# Patient Record
Sex: FEMALE | Race: BLACK OR AFRICAN AMERICAN | Employment: FULL TIME | URBAN - METROPOLITAN AREA
[De-identification: names, ages, dates, MRNs, and addresses within clinical notes are randomized per-mention and may not be internally consistent; named-entity substitution may affect disease eponyms.]

---

## 2021-07-28 PROBLEM — J02.0 STREP PHARYNGITIS: Status: ACTIVE | Noted: 2021-07-28

## 2021-07-29 ENCOUNTER — HOSPITAL ENCOUNTER (INPATIENT)
Age: 71
LOS: 1 days | Discharge: HOME | DRG: 153 | End: 2021-07-30
Attending: INTERNAL MEDICINE | Admitting: INTERNAL MEDICINE
Payer: COMMERCIAL

## 2021-07-29 PROBLEM — Z85.72 HISTORY OF B-CELL LYMPHOMA: Status: ACTIVE | Noted: 2021-03-24

## 2021-07-29 PROBLEM — N18.9 CHRONIC KIDNEY DISEASE: Status: ACTIVE | Noted: 2021-07-29

## 2021-07-29 LAB
ABSOLUTE EOS #: 0 K/UL (ref 0–0.4)
ABSOLUTE IMMATURE GRANULOCYTE: 0.1 K/UL (ref 0–0.3)
ABSOLUTE LYMPH #: 0.3 K/UL (ref 1–4.8)
ABSOLUTE MONO #: 0.4 K/UL (ref 0.2–0.8)
ANION GAP SERPL CALCULATED.3IONS-SCNC: 15 MMOL/L (ref 9–17)
BASOPHILS # BLD: 0 %
BASOPHILS ABSOLUTE: 0 K/UL (ref 0–0.2)
BUN BLDV-MCNC: 42 MG/DL (ref 8–23)
BUN/CREAT BLD: 49 (ref 9–20)
CALCIUM SERPL-MCNC: 8.8 MG/DL (ref 8.6–10.4)
CHLORIDE BLD-SCNC: 107 MMOL/L (ref 98–107)
CO2: 22 MMOL/L (ref 20–31)
CREAT SERPL-MCNC: 0.85 MG/DL (ref 0.5–0.9)
DIFFERENTIAL TYPE: ABNORMAL
EOSINOPHILS RELATIVE PERCENT: 0 % (ref 1–4)
GFR AFRICAN AMERICAN: >60 ML/MIN
GFR NON-AFRICAN AMERICAN: >60 ML/MIN
GFR SERPL CREATININE-BSD FRML MDRD: ABNORMAL ML/MIN/{1.73_M2}
GFR SERPL CREATININE-BSD FRML MDRD: ABNORMAL ML/MIN/{1.73_M2}
GLUCOSE BLD-MCNC: 115 MG/DL (ref 70–99)
HCT VFR BLD CALC: 30.8 % (ref 36.3–47.1)
HEMOGLOBIN: 9.8 G/DL (ref 11.9–15.1)
IMMATURE GRANULOCYTES: 1 %
LACTIC ACID: 1 MMOL/L (ref 0.5–2.2)
LACTIC ACID: 1.4 MMOL/L (ref 0.5–2.2)
LYMPHOCYTES # BLD: 3 % (ref 24–44)
MCH RBC QN AUTO: 29.3 PG (ref 25.2–33.5)
MCHC RBC AUTO-ENTMCNC: 31.8 G/DL (ref 28.4–34.8)
MCV RBC AUTO: 92.2 FL (ref 82.6–102.9)
MONOCYTES # BLD: 4 % (ref 1–7)
NRBC AUTOMATED: 0 PER 100 WBC
PDW BLD-RTO: 14.2 % (ref 11.8–14.4)
PLATELET # BLD: 232 K/UL (ref 138–453)
PLATELET ESTIMATE: ABNORMAL
PMV BLD AUTO: 9.8 FL (ref 8.1–13.5)
POTASSIUM SERPL-SCNC: 3.7 MMOL/L (ref 3.7–5.3)
PROCALCITONIN: 0.05 NG/ML
RBC # BLD: 3.34 M/UL (ref 3.95–5.11)
RBC # BLD: ABNORMAL 10*6/UL
SEG NEUTROPHILS: 92 % (ref 36–66)
SEGMENTED NEUTROPHILS ABSOLUTE COUNT: 9.2 K/UL (ref 1.8–7.7)
SODIUM BLD-SCNC: 144 MMOL/L (ref 135–144)
WBC # BLD: 10 K/UL (ref 3.5–11.3)
WBC # BLD: ABNORMAL 10*3/UL

## 2021-07-29 PROCEDURE — 80048 BASIC METABOLIC PNL TOTAL CA: CPT

## 2021-07-29 PROCEDURE — APPSS45 APP SPLIT SHARED TIME 31-45 MINUTES: Performed by: NURSE PRACTITIONER

## 2021-07-29 PROCEDURE — 97530 THERAPEUTIC ACTIVITIES: CPT

## 2021-07-29 PROCEDURE — 2580000003 HC RX 258: Performed by: NURSE PRACTITIONER

## 2021-07-29 PROCEDURE — 6360000002 HC RX W HCPCS: Performed by: NURSE PRACTITIONER

## 2021-07-29 PROCEDURE — 83605 ASSAY OF LACTIC ACID: CPT

## 2021-07-29 PROCEDURE — APPNB30 APP NON BILLABLE TIME 0-30 MINS: Performed by: NURSE PRACTITIONER

## 2021-07-29 PROCEDURE — 84145 PROCALCITONIN (PCT): CPT

## 2021-07-29 PROCEDURE — 87040 BLOOD CULTURE FOR BACTERIA: CPT

## 2021-07-29 PROCEDURE — 99222 1ST HOSP IP/OBS MODERATE 55: CPT | Performed by: NURSE PRACTITIONER

## 2021-07-29 PROCEDURE — 97116 GAIT TRAINING THERAPY: CPT

## 2021-07-29 PROCEDURE — 97162 PT EVAL MOD COMPLEX 30 MIN: CPT

## 2021-07-29 PROCEDURE — 36415 COLL VENOUS BLD VENIPUNCTURE: CPT

## 2021-07-29 PROCEDURE — 85025 COMPLETE CBC W/AUTO DIFF WBC: CPT

## 2021-07-29 PROCEDURE — 1200000000 HC SEMI PRIVATE

## 2021-07-29 RX ORDER — ACETAMINOPHEN 325 MG/1
650 TABLET ORAL EVERY 6 HOURS PRN
Status: DISCONTINUED | OUTPATIENT
Start: 2021-07-29 | End: 2021-07-30 | Stop reason: HOSPADM

## 2021-07-29 RX ORDER — POTASSIUM CHLORIDE 7.45 MG/ML
10 INJECTION INTRAVENOUS PRN
Status: DISCONTINUED | OUTPATIENT
Start: 2021-07-29 | End: 2021-07-30 | Stop reason: HOSPADM

## 2021-07-29 RX ORDER — RAMIPRIL 5 MG/1
5 CAPSULE ORAL DAILY
COMMUNITY

## 2021-07-29 RX ORDER — IBUPROFEN 600 MG/1
600 TABLET ORAL EVERY 8 HOURS PRN
Status: ON HOLD | COMMUNITY
End: 2021-07-29 | Stop reason: ALTCHOICE

## 2021-07-29 RX ORDER — SODIUM CHLORIDE 0.9 % (FLUSH) 0.9 %
10 SYRINGE (ML) INJECTION PRN
Status: DISCONTINUED | OUTPATIENT
Start: 2021-07-29 | End: 2021-07-30 | Stop reason: HOSPADM

## 2021-07-29 RX ORDER — ONDANSETRON 2 MG/ML
4 INJECTION INTRAMUSCULAR; INTRAVENOUS EVERY 6 HOURS PRN
Status: DISCONTINUED | OUTPATIENT
Start: 2021-07-29 | End: 2021-07-30 | Stop reason: HOSPADM

## 2021-07-29 RX ORDER — SODIUM CHLORIDE 9 MG/ML
INJECTION, SOLUTION INTRAVENOUS CONTINUOUS
Status: DISCONTINUED | OUTPATIENT
Start: 2021-07-29 | End: 2021-07-30 | Stop reason: HOSPADM

## 2021-07-29 RX ORDER — POLYETHYLENE GLYCOL 3350 17 G/17G
17 POWDER, FOR SOLUTION ORAL DAILY PRN
Status: DISCONTINUED | OUTPATIENT
Start: 2021-07-29 | End: 2021-07-30 | Stop reason: HOSPADM

## 2021-07-29 RX ORDER — IBRUTINIB 420 MG/1
420 TABLET, FILM COATED ORAL DAILY
COMMUNITY

## 2021-07-29 RX ORDER — GABAPENTIN 100 MG/1
100-200 CAPSULE ORAL 3 TIMES DAILY
COMMUNITY

## 2021-07-29 RX ORDER — SODIUM CHLORIDE 9 MG/ML
25 INJECTION, SOLUTION INTRAVENOUS PRN
Status: DISCONTINUED | OUTPATIENT
Start: 2021-07-29 | End: 2021-07-30 | Stop reason: HOSPADM

## 2021-07-29 RX ORDER — DEXAMETHASONE SODIUM PHOSPHATE 10 MG/ML
10 INJECTION, SOLUTION INTRAMUSCULAR; INTRAVENOUS EVERY 8 HOURS
Status: DISCONTINUED | OUTPATIENT
Start: 2021-07-29 | End: 2021-07-30 | Stop reason: HOSPADM

## 2021-07-29 RX ORDER — ACETAMINOPHEN 650 MG/1
650 SUPPOSITORY RECTAL EVERY 6 HOURS PRN
Status: DISCONTINUED | OUTPATIENT
Start: 2021-07-29 | End: 2021-07-30 | Stop reason: HOSPADM

## 2021-07-29 RX ORDER — ONDANSETRON 4 MG/1
4 TABLET, ORALLY DISINTEGRATING ORAL EVERY 8 HOURS PRN
Status: DISCONTINUED | OUTPATIENT
Start: 2021-07-29 | End: 2021-07-30 | Stop reason: HOSPADM

## 2021-07-29 RX ORDER — CARVEDILOL 25 MG/1
25 TABLET ORAL 2 TIMES DAILY WITH MEALS
COMMUNITY

## 2021-07-29 RX ORDER — MAGNESIUM SULFATE 1 G/100ML
1000 INJECTION INTRAVENOUS PRN
Status: DISCONTINUED | OUTPATIENT
Start: 2021-07-29 | End: 2021-07-30 | Stop reason: HOSPADM

## 2021-07-29 RX ORDER — POTASSIUM CHLORIDE 20 MEQ/1
40 TABLET, EXTENDED RELEASE ORAL PRN
Status: DISCONTINUED | OUTPATIENT
Start: 2021-07-29 | End: 2021-07-30 | Stop reason: HOSPADM

## 2021-07-29 RX ORDER — SODIUM CHLORIDE 0.9 % (FLUSH) 0.9 %
5-40 SYRINGE (ML) INJECTION EVERY 12 HOURS SCHEDULED
Status: DISCONTINUED | OUTPATIENT
Start: 2021-07-29 | End: 2021-07-30 | Stop reason: HOSPADM

## 2021-07-29 RX ADMIN — ENOXAPARIN SODIUM 40 MG: 40 INJECTION SUBCUTANEOUS at 11:38

## 2021-07-29 RX ADMIN — SODIUM CHLORIDE 1500 MG: 900 INJECTION INTRAVENOUS at 21:37

## 2021-07-29 RX ADMIN — SODIUM CHLORIDE, PRESERVATIVE FREE 10 ML: 5 INJECTION INTRAVENOUS at 18:30

## 2021-07-29 RX ADMIN — SODIUM CHLORIDE 1500 MG: 900 INJECTION INTRAVENOUS at 15:43

## 2021-07-29 RX ADMIN — SODIUM CHLORIDE 1500 MG: 900 INJECTION INTRAVENOUS at 11:38

## 2021-07-29 RX ADMIN — DEXAMETHASONE SODIUM PHOSPHATE 10 MG: 10 INJECTION, SOLUTION INTRAMUSCULAR; INTRAVENOUS at 11:38

## 2021-07-29 RX ADMIN — DEXAMETHASONE SODIUM PHOSPHATE 10 MG: 10 INJECTION, SOLUTION INTRAMUSCULAR; INTRAVENOUS at 18:29

## 2021-07-29 RX ADMIN — SODIUM CHLORIDE: 9 INJECTION, SOLUTION INTRAVENOUS at 11:37

## 2021-07-29 ASSESSMENT — ENCOUNTER SYMPTOMS
ABDOMINAL PAIN: 0
SHORTNESS OF BREATH: 0
APNEA: 0
SORE THROAT: 1
CHEST TIGHTNESS: 0
COLOR CHANGE: 0
SINUS PAIN: 0
PHOTOPHOBIA: 0
ABDOMINAL DISTENTION: 0
SINUS PRESSURE: 1

## 2021-07-29 ASSESSMENT — PAIN SCALES - GENERAL
PAINLEVEL_OUTOF10: 0
PAINLEVEL_OUTOF10: 0

## 2021-07-29 NOTE — H&P
[facility]  Office: [address], DO, Eric Thomson, DO, Brayan Doyle, DO, Rosa Rico, DO, Diya Meyer MD, Trent Nunez MD, Ata Maradiaga MD, Jori Tyler MD, Danielle Dodson MD, Eileen Benjamin MD, Jorge Bautista MD, Phoebe Rodriguez DO, Alicja Hernández MD, Norah Frank DO, Juan R Jiménez MD,  Nigel Ross DO, Jesse Rossi MD, Anam Jimenez MD, Sushila Palmer MD, Lyudmila Heredia MD, Harini Moreno MD, Mirta Waddell MD, Betty Genao, [facility], [place], CNP, Cynthia Madden, CNP, Charley Ann, [facility], Yoseph Ruby, CNP, Margo Jett, CNP, Kvng Castellon, CNP, Jarocho Villar, CNP, Johnny Jo, CNP, Marilia Vega PA-C, Belle Cardenas, [facility], Yumiko Muller, CNP, Valerie Calix, CNP, Renato Galindo, CNP, Joo Hull, CNP, Jhon Lawrence, CNP, Kathi Peña, CNP, Cosme Howard, CNP, Gena Looney, CNP         [facility]   [address]    HISTORY AND PHYSICAL EXAMINATION            Date:   7/29/2021  Patient name:  Parisa Lema  Date of admission:  7/29/2021  8:53 AM  MRN:   [id number]  Account:  [de-identified]  YOB: 1950  PCP:    Steve Alvarez MD  Room:   2004/2004-02  Code Status:    Full Code    Chief Complaint:     Sore throat    History Obtained From:     patient    History of Present Illness:     Parisa Lema is a 78 y.o. Non- / non  female who presents with No chief complaint on file. and is admitted to the hospital for the management of Strep pharyngitis. Patient reports to outlying facility with a chief complaint of sore throat. Patient underwent extensive evaluation and was found to have strep positive pharyngitis. Patient was reportedly unable to swallow secretions at that time. Treatment was initiated and included imaging studies. CT of the neck reveals submandibular duct sialolith with sialoadenitis with enhancement of the duct.   Multiple adjacent enlarged cervical lymph nodes and soft tissue swelling are also noted. Abscess within cannot be excluded. As ear nose and throat was not available at first facility she was transferred here. Patient had a prolonged wait time secondary to bed availability and has received multiple doses of Unasyn as well as Decadron. Patient has experienced significant reduction in her swelling and discomfort. Patient reports that she is now able to swallow her secretions without complication. Patient reports mild oral discomfort at this time. ENT consult has been requested and is pending. Of additional note patient also has a personal history of lymphoma and ports that she takes medicines at night however she does not know if she is actively being treated or not. Full dictation of soft tissue neck CT is available below as well as in the media tab, actual images are unavailable on this current system. Past Medical History:     Past Medical History:   Diagnosis Date    Hypertension     Small cell B-cell lymphoma ([facility] [address]         Past Surgical History:     Past Surgical History:   Procedure Laterality Date    BONE MARROW BIOPSY Right 10/12/2016    By Dr. Loan Robbins        Medications Prior to Admission:     Prior to Admission medications    Medication Sig Start Date End Date Taking? Authorizing Provider   carvedilol (COREG) 25 MG tablet Take 25 mg by mouth 2 times daily (with meals)    Historical Provider, MD   calcium citrate (CALCITRATE) 200 MG TABS tablet Take 2 tablets by mouth 3 times daily    Historical Provider, MD   vitamin D (CHOLECALCIFEROL) 25 MCG (1000 UT) TABS tablet Take 1,000 Units by mouth daily    Historical Provider, MD   gabapentin (NEURONTIN) 100 MG capsule Take 200 mg by mouth 3 times daily.     Historical Provider, MD   ibrutinib (IMBRUVICA) 420 MG tablet Take 420 mg by mouth daily    Historical Provider, MD   ibuprofen (ADVIL;MOTRIN) 600 MG tablet Take 600 mg by mouth every 8 hours as needed for Pain    Historical Provider, MD acetaminophen (TYLENOL) 325 MG tablet Take 2 tablets by mouth every 6 hours as needed for Pain 10/15/16   Ryan Larry DO   atorvastatin (LIPITOR) 40 MG tablet Take 40 mg by mouth daily    Historical Provider, MD   amLODIPine (NORVASC) 10 MG tablet Take 10 mg by mouth daily    Historical Provider, MD   nitroGLYCERIN (NITROSTAT) 0.4 MG SL tablet Place 0.4 mg under the tongue every 5 minutes as needed for Chest pain Dissolve 1 tab under tongue at first sign of chest pain. May repeat every 5 minutes until relief is obtained. If pain persists after taking 3 tabs in a 15-minute period, or the pain is different than is typically experienced, call 9-1-1 immediately. Historical Provider, MD        Allergies:     Patient has no known allergies. Social History:     Tobacco:    reports that she has never smoked. She does not have any smokeless tobacco history on file. Alcohol:      reports no history of alcohol use. Drug Use:  reports no history of drug use. Family History:     Family History   Problem Relation Age of Onset    Heart Failure Father     Diabetes Brother        Review of Systems:     Positive and Negative as described in HPI. Review of Systems   Constitutional: Positive for activity change. Negative for appetite change and fever. HENT: Positive for drooling, sinus pressure and sore throat. Negative for sinus pain. Eyes: Negative for photophobia and visual disturbance. Respiratory: Negative for apnea, chest tightness and shortness of breath. Cardiovascular: Negative. Negative for chest pain and palpitations. Gastrointestinal: Negative for abdominal distention and abdominal pain. Endocrine: Negative for cold intolerance and heat intolerance. Genitourinary: Negative for difficulty urinating, frequency and urgency. Musculoskeletal: Negative for arthralgias and myalgias. Skin: Negative for color change, pallor and rash.    Allergic/Immunologic: Negative for environmental allergies and immunocompromised state. Neurological: Negative for syncope, facial asymmetry and weakness. Hematological: Negative for adenopathy. Does not bruise/bleed easily. Psychiatric/Behavioral: Negative for agitation. Physical Exam:   BP (!) 141/52   Pulse 53   Temp 97.9 °F (36.6 °C) (Oral)   Resp 16   Ht 5' (1.524 m)   Wt 129 lb (58.5 kg)   SpO2 100%   BMI 25.19 kg/m²   Temp (24hrs), Av.9 °F (36.6 °C), Min:97.9 °F (36.6 °C), Max:97.9 °F (36.6 °C)    No results for input(s): POCGLU in the last 72 hours. No intake or output data in the 24 hours ending 21 1105    Physical Exam  Constitutional:       General: She is not in acute distress. Appearance: Normal appearance. She is normal weight. She is not toxic-appearing. HENT:      Head: Normocephalic and atraumatic. Right Ear: Tympanic membrane normal.      Left Ear: Tympanic membrane normal.      Nose: Nose normal.      Mouth/Throat:      Mouth: Mucous membranes are moist. Angioedema (mild) present. Dentition: No dental abscesses. Tongue: No lesions. Tongue does not deviate from midline. Pharynx: Uvula midline. Pharyngeal swelling and posterior oropharyngeal erythema present. No oropharyngeal exudate. Tonsils: No tonsillar exudate or tonsillar abscesses. Eyes:      Extraocular Movements: Extraocular movements intact. Conjunctiva/sclera: Conjunctivae normal.      Pupils: Pupils are equal, round, and reactive to light. Neck:      Trachea: Trachea normal. No tracheal deviation. Cardiovascular:      Rate and Rhythm: Normal rate and regular rhythm. Pulses: Normal pulses. Heart sounds: Normal heart sounds. No murmur heard. Pulmonary:      Effort: Pulmonary effort is normal. No respiratory distress. Breath sounds: Normal breath sounds. Abdominal:      General: Abdomen is flat. Bowel sounds are normal. There is no distension. Palpations: Abdomen is soft.       Tenderness: There is no abdominal tenderness. Genitourinary:     General: Normal vulva. Musculoskeletal:         General: No swelling or tenderness. Normal range of motion. Cervical back: Normal range of motion and neck supple. No rigidity. Lymphadenopathy:      Cervical: Cervical adenopathy present. Right cervical: Superficial cervical adenopathy present. No posterior cervical adenopathy. Left cervical: Superficial cervical adenopathy present. No posterior cervical adenopathy. Skin:     General: Skin is warm and dry. Capillary Refill: Capillary refill takes less than 2 seconds. Coloration: Skin is not pale. Neurological:      General: No focal deficit present. Mental Status: She is alert and oriented to person, place, and time. Mental status is at baseline.    Psychiatric:         Mood and Affect: Mood normal.         Behavior: Behavior normal.         Investigations:      Laboratory Testing:  Recent Results (from the past 24 hour(s))   CBC Auto Differential    Collection Time: 07/29/21 10:03 AM   Result Value Ref Range    WBC 10.0 3.5 - 11.3 k/uL    RBC 3.34 (L) 3.95 - 5.11 m/uL    Hemoglobin 9.8 (L) 11.9 - 15.1 g/dL    Hematocrit 30.8 (L) 36.3 - 47.1 %    MCV 92.2 82.6 - 102.9 fL    MCH 29.3 25.2 - 33.5 pg    MCHC 31.8 28.4 - 34.8 g/dL    RDW 14.2 11.8 - 14.4 %    Platelets 890 680 - 385 k/uL    MPV 9.8 8.1 - 13.5 fL    NRBC Automated 0.0 0.0 per 100 WBC    Differential Type NOT REPORTED     WBC Morphology NOT REPORTED     RBC Morphology NOT REPORTED     Platelet Estimate NOT REPORTED     Seg Neutrophils 92 (H) 36 - 66 %    Lymphocytes 3 (L) 24 - 44 %    Monocytes 4 1 - 7 %    Eosinophils % 0 (L) 1 - 4 %    Basophils 0 %    Immature Granulocytes 1 (H) 0 %    Segs Absolute 9.20 (H) 1.8 - 7.7 k/uL    Absolute Lymph # 0.30 (L) 1.0 - 4.8 k/uL    Absolute Mono # 0.40 0.2 - 0.8 k/uL    Absolute Eos # 0.00 0.0 - 0.4 k/uL    Basophils Absolute 0.00 0.0 - 0.2 k/uL    Absolute Immature Granulocyte 0. 10 0.00 - 0.30 k/uL   Basic Metabolic Panel w/ Reflex to MG    Collection Time: 07/29/21 10:03 AM   Result Value Ref Range    Glucose 115 (H) 70 - 99 mg/dL    BUN 42 (H) 8 - 23 mg/dL    CREATININE 0.85 0.50 - 0.90 mg/dL    Bun/Cre Ratio 49 (H) 9 - 20    Calcium 8.8 8.6 - 10.4 mg/dL    Sodium 144 135 - 144 mmol/L    Potassium 3.7 3.7 - 5.3 mmol/L    Chloride 107 98 - 107 mmol/L    CO2 22 20 - 31 mmol/L    Anion Gap 15 9 - 17 mmol/L    GFR Non-African American >60 >60 mL/min    GFR African American >60 >60 mL/min    GFR Comment          GFR Staging NOT REPORTED    Lactic Acid    Collection Time: 07/29/21 10:03 AM   Result Value Ref Range    Lactic Acid 1.0 0.5 - 2.2 mmol/L            Imaging/Diagnostics:  No results found. Assessment :      Hospital Problems         Last Modified POA    * (Principal) Strep pharyngitis 7/29/2021 Yes    Lymphoma ([facility] [address] 7/29/2021 Yes    Benign essential HTN 7/29/2021 Yes    Pure hypercholesterolemia 7/29/2021 Yes    Chronic kidney disease 7/29/2021 Yes    History of B-cell lymphoma 7/29/2021 Yes                Plan:     Patient status inpatient in the  Med/Surge    1. Strep pharyngitis with possible abscess  1. Continue Unasyn as ordered  1. Add lactic acid x2 and blood cultures x2  2. Continue steroids as ordered  3. Okay to advance diet to full liquid from internal medicine standpoint, defer to ENT on advancement timeline  4. ENT consultation pending  2. Essential hypertension with hypercholesterolemia  1. We will restart home medication regiment once pharmacy review is completed  3. Personal history of chronic kidney disease, renal function back to baseline, avoid nephrotoxic medications if able  4. Personal history of lymphoma  1.  We will restart home medications once pharmacy evaluation is completed      Consultations:   IP CONSULT TO OTOLARYNGOLOGY    Patient is admitted as inpatient status because of co-morbidities listed above, severity of signs and symptoms as outlined, requirement for current medical therapies and most importantly because of direct risk to patient if care not provided in a hospital setting. Expected length of stay > 48 hours.     COLBY Lei NP  7/29/2021  11:05 AM    Copy sent to Dr. Michelle Roberts MD

## 2021-07-29 NOTE — PLAN OF CARE
Problem: Falls - Risk of:  Goal: Will remain free from falls  Description: Will remain free from falls  Outcome: Ongoing  Goal: Absence of physical injury  Description: Absence of physical injury  Outcome: Ongoing     Problem: Pain:  Goal: Pain level will decrease  Description: Pain level will decrease  Outcome: Ongoing  Goal: Control of acute pain  Description: Control of acute pain  Outcome: Ongoing  Goal: Control of chronic pain  Description: Control of chronic pain  Outcome: Ongoing   Patient is a fall risk during this admission. Fall risk assessment was performed. Patient is absent of falls. Bed is in the lowest position. Wheels on the bed are locked. Call light and bed side table are within reach. Clutter is removed. Patient was educated to call out when needing assistance or wanting to get out of bed. Patient offered toileting assistance during rounding. Hourly rounds have been performed. Pt medicated with pain medication prn. Assessed all pain characteristics including level, type, location, frequency, and onset. Non-pharmacologic interventions offered to pt as well. Pt states pain is tolerable at this time.  Will continue to monitor

## 2021-07-29 NOTE — PROGRESS NOTES
Transitions of Care Pharmacy Service   Medication Review    The patient's list of current home medications was reviewed with her earlier today. Source(s) of information: patient, Care Everywhere, Surescripts refill report      Please feel free to call me with any questions about this encounter. Thank you. Hong Bah [address] [place] [address]   Transitions of Care Pharmacy Service  Phone:  [phone]  Fax: [phone]      Electronically signed by Hong Bah [address] [place] [address] on 7/29/2021 at 6:21 PM         Medications Prior to Admission:   carvedilol (COREG) 25 MG tablet, Take 25 mg by mouth 2 times daily (with meals)  calcium citrate (CALCITRATE) 200 MG TABS tablet, Take 2 tablets by mouth 3 times daily  vitamin D (CHOLECALCIFEROL) 25 MCG (1000 UT) TABS tablet, Take 1,000 Units by mouth 2 times daily (with meals)   gabapentin (NEURONTIN) 100 MG capsule, Take 100-200 mg by mouth 3 times daily.    ibrutinib (IMBRUVICA) 420 MG tablet, Take 420 mg by mouth daily  ramipril (ALTACE) 5 MG capsule, Take 5 mg by mouth daily  acetaminophen (TYLENOL) 325 MG tablet, Take 2 tablets by mouth every 6 hours as needed for Pain  atorvastatin (LIPITOR) 40 MG tablet, Take 40 mg by mouth daily  amLODIPine (NORVASC) 10 MG tablet, Take 10 mg by mouth daily  nitroGLYCERIN (NITROSTAT) 0.4 MG SL tablet, Place 0.4 mg under the tongue every 5 minutes as needed for Chest pain

## 2021-07-29 NOTE — PROGRESS NOTES
Physical Therapy    Facility/Department: [facility]  Initial Assessment    NAME: Zane Del Angel  : 1950  MRN: [id number]    Date of Service: 2021    PER HPI:    Zane Del Angel is a 78 y.o. Non- / non  female who presents with No chief complaint on file. and is admitted to the hospital for the management of Strep pharyngitis.     Patient reports to outlying facility with a chief complaint of sore throat. Patient underwent extensive evaluation and was found to have strep positive pharyngitis. Patient was reportedly unable to swallow secretions at that time. Treatment was initiated and included imaging studies. CT of the neck reveals submandibular duct sialolith with sialoadenitis with enhancement of the duct. Multiple adjacent enlarged cervical lymph nodes and soft tissue swelling are also noted. Abscess within cannot be excluded. As ear nose and throat was not available at first facility she was transferred here. Patient had a prolonged wait time secondary to bed availability and has received multiple doses of Unasyn as well as Decadron. Patient has experienced significant reduction in her swelling and discomfort. Patient reports that she is now able to swallow her secretions without complication. Patient reports mild oral discomfort at this time. ENT consult has been requested and is pending. Of additional note patient also has a personal history of lymphoma and ports that she takes medicines at night however she does not know if she is actively being treated or not. Discharge Recommendations:  Patient would benefit from continued therapy after discharge     Due to recent hospitalization and medical condition, pt would benefit from additional therapy at time of discharge to ensure safety. Please refer to the AM-PAC score for current functional status. Assessment   Body structures, Functions, Activity limitations: Decreased functional mobility ; Decreased balance;Decreased safe awareness;Decreased endurance;Decreased strength  Assessment: Skilled therapy needed to address deficits of strength, balance, endurance and mobility. Patient does get assist at home but still is not at baseline from fall in March. Recommend further PT at discharge. Prognosis: Good  Decision Making: Medium Complexity  Exam: AROM, strength, endurance, balance, mobility, AM-PAC  Clinical Presentation: evolving  PT Education: Goals;PT Role;Plan of Care;General Safety;Transfer Training;Gait Training  REQUIRES PT FOLLOW UP: Yes  Activity Tolerance  Activity Tolerance: Patient limited by endurance; Patient limited by fatigue       Patient Diagnosis(es): There were no encounter diagnoses. has a past medical history of Hypertension and Small cell B-cell lymphoma ([facility] [address] has a past surgical history that includes bone marrow biopsy (Right, 10/12/2016).     Restrictions  Restrictions/Precautions  Restrictions/Precautions: Up as Tolerated, Fall Risk  Required Braces or Orthoses?: No  Position Activity Restriction  Other position/activity restrictions: L UE IV  Vision/Hearing  Vision: Impaired  Vision Exceptions: Wears glasses at all times  Hearing: Within functional limits     Subjective  General  Chart Reviewed: Yes  Patient assessed for rehabilitation services?: Yes  Additional Pertinent Hx: L ankle fx s/p ORIF March 2021  Family / Caregiver Present: Yes ()  Follows Commands: Within Functional Limits  General Comment  Comments: DINORAH Og states patient is appropriate for therapy, needs eval ASAP for D/C  Subjective  Subjective: patient pleasant and cooperative  Pain Screening  Patient Currently in Pain: Yes          Orientation  Orientation  Overall Orientation Status: Within Functional Limits  Social/Functional History  Social/Functional History  Lives With: Spouse  Type of Home: House  Home Layout: One level  Home Access: Stairs to enter without rails  Entrance Stairs - Number of Steps: 3  Bathroom Shower/Tub: Walk-in shower  Bathroom Equipment: Shower chair, Hand-held shower, 3-in-1 commode  Home Equipment: Rolling walker, [employer], [employer] Help From: Other (comment) (aide 5 days/week for 6 hours)  ADL Assistance: Needs assistance (assist with getting into shower, independent with dressing and toileting)  Homemaking Assistance: Needs assistance (aide and )  Homemaking Responsibilities: No  Ambulation Assistance: Needs assistance ( assists with roll walker)  Transfer Assistance: Needs assistance ( assists with bed mobility)  Active : Yes  Occupation: Retired  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  Additional Comments: fell in March, goes to OP PT 3x/week, has Home health aide 5 days/week for 6 hours  Cognition   Cognition  Overall Cognitive Status: Exceptions  Following Commands:  Follows all commands without difficulty  Attention Span: Appears intact  Memory: Appears intact  Safety Judgement: Good awareness of safety precautions  Problem Solving: Assistance required to implement solutions  Insights: Fully aware of deficits  Initiation: Requires cues for some  Sequencing: Requires cues for some    Objective     Observation/Palpation  Posture: Fair (kyphotic)  Observation: resting in bed  Edema: Min LE edema    AROM RLE (degrees)  RLE AROM: WFL  AROM LLE (degrees)  LLE AROM : WFL (L LE ER at rest)  AROM RUE (degrees)  RUE AROM : WFL  AROM LUE (degrees)  LUE AROM : WFL  Strength RLE  Comment: 4-/5  Strength LLE  Comment: 4-/5 hip and knee, ankle 3/5  Strength RUE  Comment: 4/5  Strength LUE  Comment: 4/5  Motor Control  Gross Motor?: WFL (moves slow)  Coordination  Heel to Shin: Normal     Bed mobility  Rolling to Left: Minimal assistance  Supine to Sit: Minimal assistance  Sit to Supine: Minimal assistance  Scooting: Minimal assistance  Comment: per patient and her ,  assists patient into and out of bed at home  Transfers  Sit to Stand: Minimal Assistance  Stand to sit: Minimal Assistance  Comment: toilet transfer min A, min A for pulling pants up, cues for hand placement and patient needed cues to back all the way up to bed/toilet before she starts to sit down  Ambulation  Ambulation?: Yes  More Ambulation?: Yes  Ambulation 1  Surface: level tile  Device: Rolling Walker  Assistance: Minimal assistance  Quality of Gait: decreased  LLE step length, decreased heel strike L LE  Gait Deviations: Slow Melissa;Decreased step length  Distance: 30'x2  Comments: patient moves slow, occasional posterior lean     Balance  Posture: Good  Sitting - Static: Good  Sitting - Dynamic: Good  Standing - Static: Fair  Standing - Dynamic: [address]  Times per week: 1-2x/day for 5-6 days/week  Current Treatment Recommendations: Strengthening, Transfer Training, Endurance Training, Home Exercise Program, Gait Training, Balance Training, Safety Education & Training, Patient/Caregiver Education & Training, Neuromuscular Re-education  Safety Devices  Type of devices: Gait belt, Left in bed, Call light within reach, Bed alarm in place      AM-PAC Score  AM-PAC Inpatient Mobility Raw Score : 17 (07/29/21 1633)  AM-PAC Inpatient T-Scale Score : 42.13 (07/29/21 1633)  Mobility Inpatient CMS 0-100% Score: 50.57 (07/29/21 1633)  Mobility Inpatient CMS G-Code Modifier : CK (07/29/21 1633)          Goals  Short term goals  Time Frame for Short term goals: 10 visits  Short term goal 1: Independent bed mobility  Short term goal 2:  Independent sit<>stand  Short term goal 3: Patient to ambulate 100' with roll walker SBA  Short term goal 4: Patient to tolerate 25 minutes ther act to facilitate improving strength, endurance, and balance       Therapy Time   Individual Concurrent Group Co-treatment   Time In 1550         Time Out 1624 (additional 10 minutes for chart review)         Minutes 34+10=44          Treatment Time: 23 minutes       Gisel Rivas, PT

## 2021-07-30 VITALS
BODY MASS INDEX: 27.13 KG/M2 | SYSTOLIC BLOOD PRESSURE: 167 MMHG | RESPIRATION RATE: 16 BRPM | WEIGHT: 138.2 LBS | DIASTOLIC BLOOD PRESSURE: 54 MMHG | HEART RATE: 55 BPM | HEIGHT: 60 IN | TEMPERATURE: 98.6 F | OXYGEN SATURATION: 100 %

## 2021-07-30 PROBLEM — K11.20 SIALOADENITIS: Status: ACTIVE | Noted: 2021-07-30

## 2021-07-30 LAB
ANION GAP SERPL CALCULATED.3IONS-SCNC: 12 MMOL/L (ref 9–17)
BUN BLDV-MCNC: 36 MG/DL (ref 8–23)
BUN/CREAT BLD: 51 (ref 9–20)
CALCIUM SERPL-MCNC: 8.2 MG/DL (ref 8.6–10.4)
CHLORIDE BLD-SCNC: 108 MMOL/L (ref 98–107)
CO2: 21 MMOL/L (ref 20–31)
CREAT SERPL-MCNC: 0.71 MG/DL (ref 0.5–0.9)
GFR AFRICAN AMERICAN: >60 ML/MIN
GFR NON-AFRICAN AMERICAN: >60 ML/MIN
GFR SERPL CREATININE-BSD FRML MDRD: ABNORMAL ML/MIN/{1.73_M2}
GFR SERPL CREATININE-BSD FRML MDRD: ABNORMAL ML/MIN/{1.73_M2}
GLUCOSE BLD-MCNC: 135 MG/DL (ref 70–99)
HCT VFR BLD CALC: 28.7 % (ref 36.3–47.1)
HEMOGLOBIN: 9.4 G/DL (ref 11.9–15.1)
MCH RBC QN AUTO: 29.7 PG (ref 25.2–33.5)
MCHC RBC AUTO-ENTMCNC: 32.8 G/DL (ref 28.4–34.8)
MCV RBC AUTO: 90.5 FL (ref 82.6–102.9)
NRBC AUTOMATED: 0 PER 100 WBC
PDW BLD-RTO: 14.2 % (ref 11.8–14.4)
PLATELET # BLD: 212 K/UL (ref 138–453)
PMV BLD AUTO: 9.8 FL (ref 8.1–13.5)
POTASSIUM SERPL-SCNC: 3.6 MMOL/L (ref 3.7–5.3)
RBC # BLD: 3.17 M/UL (ref 3.95–5.11)
SODIUM BLD-SCNC: 141 MMOL/L (ref 135–144)
WBC # BLD: 11.8 K/UL (ref 3.5–11.3)

## 2021-07-30 PROCEDURE — 6360000002 HC RX W HCPCS: Performed by: NURSE PRACTITIONER

## 2021-07-30 PROCEDURE — 97530 THERAPEUTIC ACTIVITIES: CPT

## 2021-07-30 PROCEDURE — 2580000003 HC RX 258: Performed by: NURSE PRACTITIONER

## 2021-07-30 PROCEDURE — 97535 SELF CARE MNGMENT TRAINING: CPT

## 2021-07-30 PROCEDURE — 97116 GAIT TRAINING THERAPY: CPT

## 2021-07-30 PROCEDURE — APPSS45 APP SPLIT SHARED TIME 31-45 MINUTES: Performed by: NURSE PRACTITIONER

## 2021-07-30 PROCEDURE — 99238 HOSP IP/OBS DSCHRG MGMT 30/<: CPT | Performed by: INTERNAL MEDICINE

## 2021-07-30 PROCEDURE — 36415 COLL VENOUS BLD VENIPUNCTURE: CPT

## 2021-07-30 PROCEDURE — 80048 BASIC METABOLIC PNL TOTAL CA: CPT

## 2021-07-30 PROCEDURE — 97166 OT EVAL MOD COMPLEX 45 MIN: CPT

## 2021-07-30 PROCEDURE — APPNB60 APP NON BILLABLE TIME 46-60 MINS: Performed by: NURSE PRACTITIONER

## 2021-07-30 PROCEDURE — 85027 COMPLETE CBC AUTOMATED: CPT

## 2021-07-30 RX ORDER — AMOXICILLIN 500 MG/1
500 CAPSULE ORAL 2 TIMES DAILY
Qty: 20 CAPSULE | Refills: 0 | Status: SHIPPED | OUTPATIENT
Start: 2021-07-30 | End: 2021-08-09

## 2021-07-30 RX ADMIN — ENOXAPARIN SODIUM 40 MG: 40 INJECTION SUBCUTANEOUS at 09:35

## 2021-07-30 RX ADMIN — SODIUM CHLORIDE 1500 MG: 900 INJECTION INTRAVENOUS at 09:35

## 2021-07-30 RX ADMIN — SODIUM CHLORIDE 1500 MG: 900 INJECTION INTRAVENOUS at 04:19

## 2021-07-30 RX ADMIN — DEXAMETHASONE SODIUM PHOSPHATE 10 MG: 10 INJECTION, SOLUTION INTRAMUSCULAR; INTRAVENOUS at 09:35

## 2021-07-30 RX ADMIN — SODIUM CHLORIDE: 9 INJECTION, SOLUTION INTRAVENOUS at 03:32

## 2021-07-30 RX ADMIN — DEXAMETHASONE SODIUM PHOSPHATE 10 MG: 10 INJECTION, SOLUTION INTRAMUSCULAR; INTRAVENOUS at 04:19

## 2021-07-30 NOTE — CONSULTS
injection 10 mL, 10 mL, Intravenous, PRN  ampicillin-sulbactam (UNASYN) 1500 mg IVPB minibag, 1,500 mg, Intravenous, Q6H  dexamethasone (PF) (DECADRON) injection 10 mg, 10 mg, Intravenous, Q8H  Allergies:  Patient has no known allergies. Social History:    Social History     Socioeconomic History    Marital status:      Spouse name: Not on file    Number of children: Not on file    Years of education: Not on file    Highest education level: Not on file   Occupational History    Not on file   Tobacco Use    Smoking status: Never Smoker   Substance and Sexual Activity    Alcohol use: No    Drug use: No    Sexual activity: Not on file   Other Topics Concern    Not on file   Social History Narrative    Not on file     Social Determinants of Health     Financial Resource Strain:     Difficulty of Paying Living Expenses:    Food Insecurity:     Worried About Running Out of Food in the Last Year:     [address] in the Last Year:    Transportation Needs:     Lack of Transportation (Medical):      Lack of Transportation (Non-Medical):    Physical Activity:     Days of Exercise per Week:     Minutes of Exercise per Session:    Stress:     Feeling of Stress :    Social Connections:     Frequency of Communication with Friends and Family:     Frequency of Social Gatherings with Friends and Family:     Attends Rastafari Services:     Active Member of Clubs or Organizations:     Attends Club or Organization Meetings:     Marital Status:    Intimate Partner Violence:     Fear of Current or Ex-Partner:     Emotionally Abused:     Physically Abused:     Sexually Abused:        Family History:        Problem Relation Age of Onset    Heart Failure Father     Diabetes Brother        REVIEW OF SYSTEMS:  As above and:  CONSTITUTIONAL:  negative  EYES:  negative   HEENT:  negative   RESPIRATORY:  negative   CARDIOVASCULAR:  negative    GASTROINTESTINAL:  negative   GENITOURINARY:  negative INTEGUMENT/BREAST:  negative   HEMATOLOGIC/LYMPHATIC:  negative   ALLERGIC/IMMUNOLOGIC:  negative   ENDOCRINE:  negative   MUSCULOSKELETAL:  negative   NEUROLOGICAL:  negative   BEHAVIOR/PSYCH:  negative     PHYSICAL EXAM:    VITALS:  BP (!) 144/45   Pulse 56   Temp 98.2 °F (36.8 °C) (Oral)   Resp 16   Ht 5' (1.524 m)   Wt 129 lb (58.5 kg)   SpO2 97%   BMI 25.19 kg/m²       CONSTITUTIONAL:  awake, alert, cooperative, no apparent distress, and appears stated age  EYES:  Lids and lashes normal, pupils equal, round and reactive to light, extra ocular muscles intact, sclera clear, conjunctiva normal  ENT:  Normocephalic, without obvious abnormality, atraumatic, sinuses nontender on palpation, external ears without lesions, oral pharynx with moist mucus membranes, tonsils without erythema or exudates, gums normal.  NECK:  Supple, symmetrical, trachea midline, no adenopathy, thyroid symmetric, not enlarged and mild tenderness on right, skin normal  MUSCULOSKELETAL:  There is no redness, warmth, or swelling of the joints. Full range of motion noted. Motor strength is 5 out of 5 all extremities bilaterally. Tone is normal.  NEUROLOGIC:  Awake, alert, oriented to name, place and time. Cranial nerves II-XII are grossly intact. Motor is 5 out of 5 bilaterally. Sensory is intact.      DATA:    Labs and Radiology reports/films reviewed

## 2021-07-30 NOTE — DISCHARGE SUMMARY
[facility]  Office: [address], DO, Randallrupa Robles, DO, Shannan Chaudhary, DO, Taylor Russell Blood, DO, Doug Marie MD, Maya Dover MD, Lora Shankar MD, Fadia Solis MD, Yuri Bowen MD, Carline Blackwell MD, Tahmina Monteiro MD, Hillary Falk, DO, Peña Dunn MD, Grace Hoskins DO, Cresencio Chung MD,  Blair Farah DO, Sae Carrion MD, Taiwo Quintero MD, Lyn Harp MD, Jim Salas MD, Margaret Preciado MD, Itzel White MD, Skinny Pruett, [facility], [facility], CNP, Deb Borges, CNP, Michelle Mcclellan, CNS, Easton Pastmary, CNP, Alyssa Sotelo, CNP, Ainsley Queen, CNP, Dipika Church, CNP, Lily Trujillo, CNP, Marlys Teixeira PA-C, Ralph Puentes, [facility], Alyson Francis, CNP, Cassie William, CNP, John Tracy, CNP, Jose Bruno, CNP, Derrell Paulson, CNP, Yajaira Huang, CNP, Anthony Weir, CNP, Yina Boehringer, [facility]    Discharge Summary     Patient ID: Clare Blanca  :  1950   MRN: [id number]     ACCOUNT:  [de-identified]   Patient's PCP: Ronni Rhoades MD  Admit Date: 2021   Discharge Date: 2021     Length of Stay: 1  Code Status:  Full Code  Admitting Physician: Anabel Gomez Blood, DO  Discharge Physician: COLBY Platt NP     Active Discharge Diagnoses:     Hospital Problem Lists:  Principal Problem:    Strep pharyngitis  Active Problems:    Lymphoma ([address]    Benign essential HTN    Pure hypercholesterolemia    Chronic kidney disease    History of B-cell lymphoma  Resolved Problems:    * No resolved hospital problems. *      Admission Condition:  fair     Discharged Condition: good    Hospital Stay:     Hospital Course:  Clare Blanca is a 78 y.o. female who was admitted for the management of  Strep pharyngitis , presented to ER with Sore throat.         Significant therapeutic interventions: Antibiotics and steroids, ENT evaluation    Significant Diagnostic Studies:   Labs / Micro:  CBC:   Lab Results   Component Value Date    WBC 11.8 07/30/2021    RBC 3.17 07/30/2021    HGB 9.4 07/30/2021    HCT 28.7 07/30/2021    MCV 90.5 07/30/2021    MCH 29.7 07/30/2021    MCHC 32.8 07/30/2021    RDW 14.2 07/30/2021     07/30/2021     BMP:    Lab Results   Component Value Date    GLUCOSE 135 07/30/2021     07/30/2021    K 3.6 07/30/2021     07/30/2021    CO2 21 07/30/2021    ANIONGAP 12 07/30/2021    BUN 36 07/30/2021    CREATININE 0.71 07/30/2021    BUNCRER 51 07/30/2021    CALCIUM 8.2 07/30/2021    LABGLOM >60 07/30/2021    GFRAA >60 07/30/2021    GFR      07/30/2021    GFR NOT REPORTED 07/30/2021     U/A:    Lab Results   Component Value Date    COLORU YELLOW 10/14/2016    TURBIDITY SLIGHTLY CLOUDY 10/14/2016    SPECGRAV 1.025 10/14/2016    HGBUR 3+ 10/14/2016    PHUR 6.0 10/14/2016    PROTEINU 3+ 10/14/2016    GLUCOSEU NEGATIVE 10/14/2016    KETUA NEGATIVE 10/14/2016    BILIRUBINUR NEGATIVE 10/14/2016    UROBILINOGEN Normal 10/14/2016    NITRU NEGATIVE 10/14/2016    LEUKOCYTESUR NEGATIVE 10/14/2016        Radiology:  No results found. Consultations:    Consults:     Final Specialist Recommendations/Findings:   IP CONSULT TO OTOLARYNGOLOGY      The patient was seen and examined on day of discharge and this discharge summary is in conjunction with any daily progress note from day of discharge. Discharge plan:     Disposition: Home    Physician Follow Up:     MD Shree Kwan 4  [phone]      follow up with pcp in 1 week. Requiring Further Evaluation/Follow Up POST HOSPITALIZATION/Incidental Findings:  Follow-up with ENT if your symptoms persist past 2 weeks    Diet: regular diet    Activity: As tolerated    Instructions to Patient: Take the antibiotics exactly as prescribed and follow-up with ENT in 2 weeks if your symptoms persist    Discharge Medications:      Medication List      START taking these medications amoxicillin 500 MG capsule  Commonly known as: AMOXIL  Take 1 capsule by mouth 2 times daily for 10 days        CHANGE how you take these medications    carvedilol 25 MG tablet  Commonly known as: COREG  What changed: Another medication with the same name was removed. Continue taking this medication, and follow the directions you see here. CONTINUE taking these medications    acetaminophen 325 MG tablet  Commonly known as: TYLENOL  Take 2 tablets by mouth every 6 hours as needed for Pain     amLODIPine 10 MG tablet  Commonly known as: NORVASC     atorvastatin 40 MG tablet  Commonly known as: LIPITOR     calcium citrate 200 MG Tabs tablet  Commonly known as: CALCITRATE     gabapentin 100 MG capsule  Commonly known as: NEURONTIN     Imbruvica 420 MG tablet  Generic drug: ibrutinib     nitroGLYCERIN 0.4 MG SL tablet  Commonly known as: NITROSTAT     ramipril 5 MG capsule  Commonly known as: ALTACE     vitamin D 25 MCG (1000 UT) Tabs tablet  Commonly known as: CHOLECALCIFEROL        STOP taking these medications    ibuprofen 600 MG tablet  Commonly known as: ADVIL;MOTRIN           Where to Get Your Medications      These medications were sent to [address] [place] [address]    Phone: [phone]   · amoxicillin 500 MG capsule         No discharge procedures on file. Time Spent on discharge is  38 mins in patient examination, evaluation, counseling as well as medication reconciliation, prescriptions for required medications, discharge plan and follow up. Electronically signed by   COLBY White NP  7/30/2021  9:26 AM      Thank you Dr. Nasreen Paredes MD for the opportunity to be involved in this patient's care.

## 2021-07-30 NOTE — PROGRESS NOTES
[facility]  Office: [address], DO, Amaya Rodriguez, DO, Kei Luna, DO, Liliana Brunoanitha Rico, DO, Watson Edward MD, Barry Mortensen MD, Gagan Hernandez MD, Sean Jacques MD, Beryle Marseille, MD, Ciro Pallas, MD, Darren Perales MD, Ceferino [facility], DO, Claudine Valdovinos MD, Iza Blandon DO, Fer Benson MD,  Bryce De La Cruz DO, Humberto Graham MD, Samantha Anguiano MD, Linda Ramirez MD, Sam Santos MD, Beatrice Macias MD, Payal Ulloa MD, Maria Elena Mendez, [facility], [facility], [facility], Holland Saldana, CNP, David Qureshi, CNS, Chacho Black, CNP, Khushbu Mcduffie, CNP, Ailyn Cao, CNP, Juan Poole, CNP, Duane Arceo, CNP, Johnathan Bryan PA-C, Mortimer Guardian, [facility], Jose Angel Juarez, CNP, Lavonne Frankel, CNP, Duy Harp, CNP, Lisa Madison, CNP, Ta Lyon, CNP, Savanna Easley, CNP, Alva Dailey, CNP, Alessandro Sher, [facility]    Progress Note    7/30/2021    9:13 AM    Name:   Guerrero Esteban  MRN:     [id number]     Acct:      [de-identified]   Room:   2004/2004-02   Day:  1  Admit Date:  7/29/2021  8:53 AM    PCP:   Ashley Rivera MD  Code Status:  Full Code    Subjective:     C/C: Sore throat and drooling    Interval History Status: significantly improved. Patient has experienced significant improvement during her hospital course. Patient is able to eat without difficulty and swallowing her own secretions without problem. Patient was evaluated by ENT last night and they recommend outpatient treatment with antibiotic only as she has already received her high-dose steroid. Patient is stable for discharge. Brief History:     7/29 - Patient reports to outlying facility with a chief complaint of sore throat. Patient underwent extensive evaluation and was found to have strep positive pharyngitis. Patient was reportedly unable to swallow secretions at that time. Treatment was initiated and included imaging studies.   CT of the neck reveals submandibular duct sialolith with sialoadenitis with enhancement of the duct. Multiple adjacent enlarged cervical lymph nodes and soft tissue swelling are also noted. Abscess within cannot be excluded. As ear nose and throat was not available at first facility she was transferred here. Patient had a prolonged wait time secondary to bed availability and has received multiple doses of Unasyn as well as Decadron. Patient has experienced significant reduction in her swelling and discomfort. Patient reports that she is now able to swallow her secretions without complication. Patient reports mild oral discomfort at this time. ENT consult has been requested and is pending    7/30 - Patient has experienced significant improvement during her hospital course. Patient is able to eat without difficulty and swallowing her own secretions without problem. Patient was evaluated by ENT last night and they recommend outpatient treatment with antibiotic only as she has already received her high-dose steroid. Patient is stable for discharge. Review of Systems:     Constitutional:  negative for chills, fevers, sweats  Respiratory:  negative for cough, dyspnea on exertion, shortness of breath, wheezing  Cardiovascular:  negative for chest pain, chest pressure/discomfort, lower extremity edema, palpitations  Gastrointestinal:  negative for abdominal pain, constipation, diarrhea, nausea, vomiting  Neurological:  negative for dizziness, headache  HEENT: Continues to endorse throat pain that is dramatically improved from yesterday. Continues to endorse a swelling sensation in the throat again improved from yesterday. Medications:      Allergies:  No Known Allergies    Current Meds:   Scheduled Meds:    enoxaparin  40 mg Subcutaneous Daily    sodium chloride flush  5-40 mL Intravenous 2 times per day    ampicillin-sulbactam  1,500 mg Intravenous Q6H    dexamethasone  10 mg Intravenous Q8H Continuous Infusions:    sodium chloride 75 mL/hr at 21 0332    sodium chloride       PRN Meds: sodium chloride, acetaminophen **OR** acetaminophen, magnesium sulfate, ondansetron **OR** ondansetron, polyethylene glycol, potassium chloride **OR** potassium alternative oral replacement **OR** potassium chloride, sodium chloride flush    Data:     Past Medical History:   has a past medical history of Hypertension and Small cell B-cell lymphoma ([address] Social History:   reports that she has never smoked. She does not have any smokeless tobacco history on file. She reports that she does not drink alcohol and does not use drugs. Family History:   Family History   Problem Relation Age of Onset    Heart Failure Father     Diabetes Brother        Vitals:  BP (!) 167/54   Pulse 55   Temp 98.6 °F (37 °C) (Oral)   Resp 16   Ht 5' (1.524 m)   Wt 138 lb 3.2 oz (62.7 kg)   SpO2 100%   BMI 26.99 kg/m²   Temp (24hrs), Av.2 °F (36.8 °C), Min:97.9 °F (36.6 °C), Max:98.6 °F (37 °C)    No results for input(s): POCGLU in the last 72 hours. I/O (24Hr):     Intake/Output Summary (Last 24 hours) at 2021 0913  Last data filed at 2021 1833  Gross per 24 hour   Intake --   Output 100 ml   Net -100 ml       Labs:  Hematology:  Recent Labs     21  1003 21  0555   WBC 10.0 11.8*   RBC 3.34* 3.17*   HGB 9.8* 9.4*   HCT 30.8* 28.7*   MCV 92.2 90.5   MCH 29.3 29.7   MCHC 31.8 32.8   RDW 14.2 14.2    212   MPV 9.8 9.8     Chemistry:  Recent Labs     21  1003 21  0555    141   K 3.7 3.6*    108*   CO2 22 21   GLUCOSE 115* 135*   BUN 42* 36*   CREATININE 0.85 0.71   ANIONGAP 15 12   LABGLOM >60 >60   GFRAA >60 >60   CALCIUM 8.8 8.2*   No results for input(s): PROT, LABALBU, LABA1C, M8LWQME, R0TOUCL, FT4, TSH, AST, ALT, LDH, GGT, ALKPHOS, LABGGT, BILITOT, BILIDIR, AMMONIA, AMYLASE, LIPASE, LACTATE, CHOL, HDL, LDLCHOLESTEROL, CHOLHDLRATIO, TRIG, VLDL, BBL21XH, PHENYTOIN, PHENYF, URICACID, POCGLU in the last 72 hours. ABG:No results found for: POCPH, PHART, PH, POCPCO2, ZDM7RCA, PCO2, POCPO2, PO2ART, PO2, POCHCO3, AEH1KLU, HCO3, NBEA, PBEA, BEART, BE, THGBART, THB, DOK0ZPT, LOSO6UBG, Z4NORNIZ, O2SAT, FIO2  Lab Results   Component Value Date/Time    SPECIAL RIGHT AC, 10ML 07/29/2021 12:24 PM     Lab Results   Component Value Date/Time    CULTURE NO GROWTH 16 HOURS 07/29/2021 12:24 PM       Radiology:  See dictation on H&P    Physical Examination:        General appearance:  alert, cooperative and no distress  Mental Status:  oriented to person, place and time and normal affect  Lungs:  clear to auscultation bilaterally, normal effort  Heart:  regular rate and rhythm, no murmur  Abdomen:  soft, nontender, nondistended, normal bowel sounds, no masses, hepatomegaly, splenomegaly  Extremities:  no edema, redness, tenderness in the calves  Skin:  no gross lesions, rashes, induration  HEENT: Continued lymphadenopathy to the cervical nodes, right worse than left. This is improved from yesterday's exam.  Oropharynx clear.     Assessment:        Hospital Problems         Last Modified POA    * (Principal) Strep pharyngitis 7/29/2021 Yes    Lymphoma ([facility] [address] 7/29/2021 Yes    Benign essential HTN 7/29/2021 Yes    Pure hypercholesterolemia 7/29/2021 Yes    Chronic kidney disease 7/29/2021 Yes    History of B-cell lymphoma 7/29/2021 Yes          Plan:        1. strep pharyngitis  1. transition to amoxicillin 500 twice daily for 10 days  2. no need for steroids to continue, received high-dose IV on arrival  3. stable for discharge  2. essential hypertension with hyperlipidemia  1. elevated this morning, has not yet received oral meds, continue regiment as an outpatient and follow-up with PCP for continued monitoring      COLBY Luque NP  7/30/2021  9:13 AM

## 2021-07-30 NOTE — PLAN OF CARE
Problem: Falls - Risk of:  Goal: Will remain free from falls  Description: Will remain free from falls  7/30/2021 0954 by Kehinde Elizalde RN  Outcome: Ongoing  7/30/2021 0526 by Yasmine Hinton RN  Outcome: Ongoing  Goal: Absence of physical injury  Description: Absence of physical injury  7/30/2021 0954 by Kehinde Elizalde RN  Outcome: Ongoing  7/30/2021 0526 by Yasmine Hinton RN  Outcome: Ongoing     Problem: Pain:  Goal: Pain level will decrease  Description: Pain level will decrease  7/30/2021 0954 by Kehinde Elizalde RN  Outcome: Ongoing  7/30/2021 0526 by Yasmine Hinton RN  Outcome: Ongoing  Goal: Control of acute pain  Description: Control of acute pain  7/30/2021 0954 by Kehinde Elizalde RN  Outcome: Ongoing  7/30/2021 0526 by Yasmine Hinton RN  Outcome: Ongoing  Goal: Control of chronic pain  Description: Control of chronic pain  7/30/2021 0954 by Kehinde Elizalde RN  Outcome: Ongoing  7/30/2021 0526 by Yasmine Hinton RN  Outcome: Ongoing     Problem: IP MOBILITY  Goal: LTG - patient will ambulate community distance  7/30/2021 0954 by Kehinde Elizalde RN  Outcome: Ongoing  7/30/2021 0526 by Yasmine Hinton RN  Outcome: Ongoing   Patient is a fall risk during this admission. Fall risk assessment was performed. Patient is absent of falls. Bed is in the lowest position. Wheels on the bed are locked. Call light and bed side table are within reach. Clutter is removed. Patient was educated to call out when needing assistance or wanting to get out of bed. Patient offered toileting assistance during rounding. Hourly rounds have been performed. Pt medicated with pain medication prn. Assessed all pain characteristics including level, type, location, frequency, and onset. Non-pharmacologic interventions offered to pt as well. Pt states pain is tolerable at this time.  Will continue to monitor

## 2021-07-30 NOTE — FLOWSHEET NOTE
Patient was sitting up in bedside chair. Patient states no major needs . States she is well. States possible discharge today.  shared in presence, prayers. Follow up as needed. 07/30/21 1329   Encounter Summary   Services provided to: Patient   Referral/Consult From: [address] [facility] Family members   Continue Visiting   (7-30-21)   Complexity of Encounter Low   Length of Encounter 15 minutes   Spiritual Assessment Completed Yes   Routine   Type Initial   Assessment Calm; Approachable   Intervention Prayer;Explored feelings, thoughts, concerns; Discussed illness/injury and it's impact; Discussed belief system/Cheondoism practices/chadd   Outcome Expressed gratitude;Receptive;Engaged in conversation;Expressed feelings/needs/concerns

## 2021-07-30 NOTE — PROGRESS NOTES
Physical Therapy  Facility/Department: [facility]  Daily Treatment Note  NAME: Kady Cottrell  : 1950  MRN: [id number]    Date of Service: 2021    Discharge Recommendations:  Patient would benefit from continued therapy after discharge     Due to recent hospitalization and medical condition, pt would benefit from additional therapy at time of discharge to ensure safety. Please refer to the AM-PAC score for current functional status. Assessment   Body structures, Functions, Activity limitations: Decreased functional mobility ; Decreased balance;Decreased safe awareness;Decreased endurance;Decreased strength  Assessment: Skilled therapy needed to address deficits of strength, balance, endurance and mobility. Decreased assist needed this date with transfers. Patient does get assist at home but still is not at baseline from fall in March. Recommend further PT at discharge. Prognosis: Good  Decision Making: Medium Complexity  PT Education: Transfer Training;Gait Training;General Safety; Functional Mobility Training  REQUIRES PT FOLLOW UP: Yes  Activity Tolerance  Activity Tolerance: Patient limited by fatigue;Patient limited by endurance     Patient Diagnosis(es): There were no encounter diagnoses. has a past medical history of Hypertension and Small cell B-cell lymphoma ([facility] [address] has a past surgical history that includes bone marrow biopsy (Right, 10/12/2016).     Restrictions  Restrictions/Precautions  Restrictions/Precautions: Up as Tolerated, Fall Risk  Required Braces or Orthoses?: No  Position Activity Restriction  Other position/activity restrictions: L UE IV  Subjective   General  Additional Pertinent Hx: L ankle fx s/p ORIF 2021  Family / Caregiver Present: No  Subjective  Subjective: patient sitting up in chair, agreeable to work with therapy, hoping to be D/C today          Orientation     Cognition      Objective   Bed mobility  Comment: patient sitting up in chair  Transfers  Sit to Stand: Stand by assistance  Stand to sit: Stand by assistance  Comment: had patient practice sit<>stand x5 reps due to demonstrating poor ecccentric control at time. With repeated cues patient able to perform sit <>stand with proper hand placement and without \"falling\" into chair  Ambulation  Ambulation?: Yes  More Ambulation?: Yes  Ambulation 1  Surface: level tile  Device: Rolling Walker  Assistance: Contact guard assistance  Quality of Gait: decreased  LLE step length, decreased heel strike L LE  Gait Deviations: Slow Melissa  Distance: 20'  Comments: patient moves slow, improved balance this date  Ambulation 2  Surface - 2: level tile  Device 2: Rolling Walker  Assistance 2: Contact guard assistance  Quality of Gait 2: decreased WB on L LE  Gait Deviations: Slow Melissa;Decreased step length  Distance: 40'  Comments: patient moves slow but did not lean posteriorly this date, continues with decreased step length and cues to stand up straight     Balance  Sitting - Static: Good  Sitting - Dynamic: Good  Standing - Static: Fair;+  Standing - Dynamic: Fair  Exercises  Hip Flexion: seated marching x2 minutes  Hip Abduction: x10  Knee Long Arc Quad: x10  Ankle Pumps: x10  Comments: sit<>stand x5                          AM-PAC Score  AM-PAC Inpatient Mobility Raw Score : 17 (07/30/21 1059)  AM-PAC Inpatient T-Scale Score : 42.13 (07/30/21 1059)  Mobility Inpatient CMS 0-100% Score: 50.57 (07/30/21 1059)  Mobility Inpatient CMS G-Code Modifier : CK (07/30/21 1059)          Goals  Short term goals  Time Frame for Short term goals: 10 visits  Short term goal 1: Independent bed mobility  Short term goal 2:  Independent sit<>stand  Short term goal 3: Patient to ambulate 100' with roll walker SBA  Short term goal 4: Patient to tolerate 25 minutes ther act to facilitate improving strength, endurance, and balance    Plan    Plan  Times per week: 1-2x/day for 5-6 days/week  Current Treatment Recommendations: Strengthening, Transfer Training, Endurance Training, Home Exercise Program, Gait Training, Balance Training, Safety Education & Training, Patient/Caregiver Education & Training, Neuromuscular Re-education  Safety Devices  Type of devices: Gait belt, Call light within reach, Left in chair     Therapy Time   Individual Concurrent Group Co-treatment   Time In 0857         Time Out 9358         Minutes 26                 [facility] [place], [place]

## 2021-07-30 NOTE — ACP (ADVANCE CARE PLANNING)
Advance Care Planning     Advance Care Planning Activator (Inpatient)  Conversation Note      Date of ACP Conversation: 2021     Conversation Conducted with: Patient with Decision Making Capacity    ACP Activator: Morgan Infante RN        Health Care Decision Maker:     Current Designated Health Care Decision Maker:     Click here to complete Healthcare Decision Makers including section of the Healthcare Decision Maker Relationship (ie \"Primary\")  Today we documented Decision Maker(s) consistent with Legal Next of Kin hierarchy. Care Preferences    Ventilation: \"If you were in your present state of health and suddenly became very ill and were unable to breathe on your own, what would your preference be about the use of a ventilator (breathing machine) if it were available to you? \"      Would the patient desire the use of ventilator (breathing machine)?: yes    \"If your health worsens and it becomes clear that your chance of recovery is unlikely, what would your preference be about the use of a ventilator (breathing machine) if it were available to you? \"     Would the patient desire the use of ventilator (breathing machine)?: Yes      Resuscitation  \"CPR works best to restart the heart when there is a sudden event, like a heart attack, in someone who is otherwise healthy. Unfortunately, CPR does not typically restart the heart for people who have serious health conditions or who are very sick. \"    \"In the event your heart stopped as a result of an underlying serious health condition, would you want attempts to be made to restart your heart (answer \"yes\" for attempt to resuscitate) or would you prefer a natural death (answer \"no\" for do not attempt to resuscitate)? \" yes       [x] Yes   [] No   Educated Patient / Vera Apgar regarding differences between Advance Directives and portable DNR orders.     Length of ACP Conversation in minutes:      Conversation Outcomes:  [x] ACP discussion completed  [] Existing advance directive reviewed with patient; no changes to patient's previously recorded wishes  [] New Advance Directive completed  [] Portable Do Not Rescitate prepared for Provider review and signature  [] POLST/POST/MOLST/MOST prepared for Provider review and signature      Follow-up plan:    [] Schedule follow-up conversation to continue planning  [] Referred individual to Provider for additional questions/concerns   [] Advised patient/agent/surrogate to review completed ACP document and update if needed with changes in condition, patient preferences or care setting    [x] This note routed to one or more involved healthcare providers

## 2021-07-30 NOTE — PROGRESS NOTES
Occupational Therapy   Occupational Therapy Initial Assessment  Date: 2021   Patient Name: Lloyd Guzman  MRN: [id number]     : 1950    RN Michael Contreras reports patient is medically stable for therapy treatment this date. Chart reviewed prior to treatment and patient is agreeable for therapy. All lines intact and patient positioned comfortably at end of treatment. All patient needs addressed prior to ending therapy session. Date of Service: 2021    Discharge Recommendations:  Patient would benefit from continued therapy after discharge   Due to recent hospitalization and medical condition, pt would benefit from additional therapy at time of discharge to ensure safety. Please refer to the AM-PAC score for current functional status. OT Equipment Recommendations  Equipment Needed: Yes  Mobility Devices: ADL Assistive Devices  ADL Assistive Devices: Emergency Alert System;Long-handled Shoe Horn;Long-handled Sponge;Reacher;Sock-Aid Hard    Assessment   Performance deficits / Impairments: Decreased functional mobility ; Decreased ADL status; Decreased strength;Decreased endurance;Decreased high-level IADLs;Decreased posture;Decreased balance;Decreased safe awareness;Decreased cognition  Assessment: Pt would benefit from continued skilled OT services to increase I and safety during functional tasks to return home at prior level of function as able  Prognosis: Good  Decision Making: Medium Complexity  OT Education: OT Role;Transfer Training;Energy Conservation;Plan of Care;ADL Adaptive Strategies  Patient Education: Safety in function, fall prevention/call light use, recommendations for continued therapy, benefits of being oob  REQUIRES OT FOLLOW UP: Yes  Activity Tolerance  Activity Tolerance: Patient Tolerated treatment well  Activity Tolerance: fair  Safety Devices  Safety Devices in place: Yes  Type of devices:  All fall risk precautions in place;Nurse notified;Gait belt;Call light within reach; Patient at risk for falls; Left in chair           Patient Diagnosis(es): There were no encounter diagnoses. has a past medical history of Hypertension and Small cell B-cell lymphoma ([address] has a past surgical history that includes bone marrow biopsy (Right, 10/12/2016). PER H&P: Jace Kirkland is a 78 y.o. Non- / non  female who presents with No chief complaint on file. and is admitted to the hospital for the management of Strep pharyngitis.     Patient reports to outlying facility with a chief complaint of sore throat. Patient underwent extensive evaluation and was found to have strep positive pharyngitis. Patient was reportedly unable to swallow secretions at that time. Treatment was initiated and included imaging studies. CT of the neck reveals submandibular duct sialolith with sialoadenitis with enhancement of the duct. Multiple adjacent enlarged cervical lymph nodes and soft tissue swelling are also noted. Abscess within cannot be excluded. As ear nose and throat was not available at first facility she was transferred here. Patient had a prolonged wait time secondary to bed availability and has received multiple doses of Unasyn as well as Decadron. Patient has experienced significant reduction in her swelling and discomfort. Patient reports that she is now able to swallow her secretions without complication. Patient reports mild oral discomfort at this time. ENT consult has been requested and is pending. Of additional note patient also has a personal history of lymphoma and ports that she takes medicines at night however she does not know if she is actively being treated or not.       Restrictions  Restrictions/Precautions  Restrictions/Precautions: Up as Tolerated, Fall Risk  Required Braces or Orthoses?: No  Position Activity Restriction  Other position/activity restrictions: L UE IV    Subjective   General  Chart Reviewed: Yes  Patient assessed for rehabilitation services?: Yes  Family / Caregiver Present: No  Subjective  Subjective: Pt resting in bed, pleasant and agreeable to OT Eval  Patient Currently in Pain: Denies    Social/Functional History  Social/Functional History  Lives With: Spouse  Type of Home: House  Home Layout: One level  Home Access: Stairs to enter without rails  Entrance Stairs - Number of Steps: 3  Bathroom Shower/Tub: Walk-in shower  Bathroom Toilet: Handicap height  Bathroom Equipment: Shower chair, Hand-held shower, 3-in-1 commode, Toilet raiser  Home Equipment: Rolling walker, [employer], [employer] Help From: Other (comment) (aide 5 days/week for 6 hours)  ADL Assistance: Needs assistance (assist with getting into shower, independent with dressing and toileting)  Homemaking Assistance: Needs assistance (aide and )  Homemaking Responsibilities: No  Ambulation Assistance: Needs assistance ( assists with rolling walker)  Transfer Assistance: Needs assistance ( assists with bed mobility)  Active : Yes  Occupation: Retired  Type of occupation:  at [employer]: football games  Additional Comments: fell in March, goes to OP PT 3x/week, has Home health aide 5 days/week for 6 hours       Objective   Vision: Impaired (Pt denies any recent visual changes)  Vision Exceptions: Wears glasses at all times  Hearing: Within functional limits    Orientation  Overall Orientation Status: Within Functional Limits  Observation/Palpation  Posture: Fair (kyphotic with RW)  Observation: resting in bed  Edema: Min LE edema       Balance  Sitting Balance: Supervision  Standing Balance: Minimal assistance (with RW)  Standing Balance  Time: standing yue ~ 1-2 min  Activity: functional mobility  Functional Mobility  Functional - Mobility Device: Rolling Walker  Activity:  (bed around to bedside chair)  Assist Level: Minimal assistance  Functional Mobility Comments: Pt required Min verbal cues for pacing self, RW safety, scanning environment, upright posture, upright posture, and awareness/assist with lines all to increase safety. ADL  Feeding: Setup  Grooming: Setup;Stand by assistance (seated)  UE Bathing: Setup;Stand by assistance  LE Bathing: Setup;Minimal assistance  UE Dressing: Setup;Minimal assistance  LE Dressing: Setup;Minimal assistance  Toileting: Minimal assistance       Tone RUE  RUE Tone: Normotonic  Tone LUE  LUE Tone: Normotonic  Coordination  Movements Are Fluid And Coordinated: No  Coordination and Movement description: Fine motor impairments;Decreased speed;Decreased accuracy; Right UE;Left UE     Bed mobility  Supine to Sit: Stand by assistance  Sit to Supine: Stand by assistance  Scooting: Stand by assistance  Comment: Pt required Min verbal for hand placement on bedrails, pacing self and awareness/assist with lines all too increase safety. Transfers  Sit to stand: Minimal assistance  Stand to sit: Minimal assistance  Transfer Comments: Pt required Min verbal cues/tactile assist for upright posture, controlled stand to sit, reaching back to surface, slowing down movements, squaring self/AD up to surface prior to sitting all to increase safety. Cognition  Overall Cognitive Status: Exceptions  Following Commands:  Follows all commands without difficulty  Attention Span: Appears intact  Memory: Appears intact  Safety Judgement: Good awareness of safety precautions  Problem Solving: Assistance required to implement solutions  Insights: Fully aware of deficits  Initiation: Requires cues for some  Sequencing: Requires cues for some        Sensation  Overall Sensation Status: WFL        LUE AROM (degrees)  LUE AROM : WFL  RUE AROM (degrees)  RUE AROM : WFL  LUE Strength  Gross LUE Strength: WFL  LUE Strength Comment: ~ 4/5  RUE Strength  Gross RUE Strength: WFL  RUE Strength Comment: ~ 4/5              Plan   Plan  Times per week: 4-5x/wk 1x/day as yue  Current Treatment Recommendations: Strengthening, Endurance Training, Balance Training, Functional Mobility Training, Safety Education & Training, Home Management Training, Self-Care / ADL, Equipment Evaluation, Education, & procurement                          AM-PAC Score        AM-PAC Inpatient Daily Activity Raw Score: 19 (07/30/21 1232)  AM-PAC Inpatient ADL T-Scale Score : 40.22 (07/30/21 1232)  ADL Inpatient CMS 0-100% Score: 42.8 (07/30/21 1232)  ADL Inpatient CMS G-Code Modifier : CK (07/30/21 1232)      Goals  Short term goals  Time Frame for Short term goals: By discharge, pt to demo  Short term goal 1: bed mobility to Mod I with use of bedrails as needed. Short term goal 2: UB ADLs to Set up and LB ADLs to SBA with use of AD as needed. Short term goal 3: toileting to Mod I with use of AD/grab bars needed. Short term goal 4: increased B UE strength by 1/2 grade to assist with self care/I with B UE HEP with use of handouts as needed. Short term goal 5: ADL transfers and functional mobility to Mod I with use of handouts as needed. Long term goals  Long term goal 1: Pt to be I with fall prevention education, EC/WS tech and recommendations for AE with use of handouts as needed. Patient Goals   Patient goals : To go home! Therapy Time   Individual Concurrent Group Co-treatment   Time In 0801         Time Out 0840         Minutes 39           tx time: 30 min    Writer educates patient on creating a safe home of removing throw rugs off floor, additional lighting into bathroom and outside by stairs,purchasing non slip mat in shower, moving cords and other items off the floor, rearranging furniture for safe r/w use throughout home, using DME of reacher, shower chair and grab bars in bathroom.           Dayana Echols, OT

## 2021-07-30 NOTE — PLAN OF CARE
Problem: Falls - Risk of:  Goal: Will remain free from falls  Description: Will remain free from falls  7/30/2021 0954 by Liliana Mitchell RN  Outcome: Completed  7/30/2021 0954 by Liliana Mitchell RN  Outcome: Ongoing  7/30/2021 0526 by Venecia Jorgensen RN  Outcome: Ongoing  Goal: Absence of physical injury  Description: Absence of physical injury  7/30/2021 0954 by Liliana Mitchell RN  Outcome: Completed  7/30/2021 0954 by Liliana Mitchell RN  Outcome: Ongoing  7/30/2021 0526 by Venecia Jorgensen RN  Outcome: Ongoing     Problem: Pain:  Goal: Pain level will decrease  Description: Pain level will decrease  7/30/2021 0954 by Liliana Mitchell RN  Outcome: Completed  7/30/2021 0954 by Liliana Mitchell RN  Outcome: Ongoing  7/30/2021 0526 by Venecia Jorgensen RN  Outcome: Ongoing  Goal: Control of acute pain  Description: Control of acute pain  7/30/2021 0954 by Liliana Mitchell RN  Outcome: Completed  7/30/2021 0954 by Liliana Mitchell RN  Outcome: Ongoing  7/30/2021 0526 by Venecia Jorgensen RN  Outcome: Ongoing  Goal: Control of chronic pain  Description: Control of chronic pain  7/30/2021 0954 by Liliana Mitchell RN  Outcome: Completed  7/30/2021 0954 by Liliana Mitchell RN  Outcome: Ongoing  7/30/2021 0526 by Venecia Jorgensen RN  Outcome: Ongoing     Problem: IP MOBILITY  Goal: LTG - patient will ambulate community distance  7/30/2021 0954 by Liliana Mitchell RN  Outcome: Completed  7/30/2021 0954 by Liliana Mitchell RN  Outcome: Ongoing  7/30/2021 0526 by Venecia Jorgensen RN  Outcome: Ongoing

## 2021-07-30 NOTE — CARE COORDINATION
Case Management Initial Discharge Plan  [facility],         Readmission Risk              Risk of Unplanned Readmission:  16             Met with:patient to discuss discharge plans. Information verified: address, contacts, phone number, , insurance Yes  PCP: Efrem Taylor MD  Date of last visit: [facility]antos [facility] Provider: Manoj Velarde     Discharge Planning  Current Residence:  Private home  Living Arrangements:  Spouse/Significant Other        Home has 1 stories/3 stairs to climb  Support Systems:  Spouse/Significant Other       Current Services PTA:  None  Agency: none      Patient able to perform ADL's:Independent  DME in home:  None   DME used to aid ambulation prior to admission:   None   DME used during admission:  None     Potential Assistance Needed:  N/A    Pharmacy: [facility]    Potential Assistance Purchasing Medications:  No  Does patient want to participate in local refill/ meds to beds program?  Yes    Patient agreeable to home care: No  Christmas of choice provided:  n/a      Type of Home Care Services:  None  Patient expects to be discharged to:   home     Prior SNF/Rehab Placement and Facility: none  Agreeable to SNF/Rehab: No  Christmas of choice provided: n/a   Evaluation: n/a    Expected Discharge date:  21  Follow Up Appointment: Best Day/ Time: Monday AM    Transportation provider: per family  Transportation arrangements needed for discharge: No    Discharge Plan:   Met with patient to complete a discharge planning. Patient lives at home with spouse. Very independent and no dme in the home. She no longer drives but her spouse does. Patient admitted with pharyngitis and Danilo NP in room during assessment. Likely dc today on oral antibiotics. Patient face sheet had her OB as her pcp and did change in epic to reflect her pcp as dr Chandu Kapoor.      Electronically signed by Bonnie Flor RN on 21 at 8:48 AM EDT

## 2021-08-04 LAB
CULTURE: NORMAL
CULTURE: NORMAL
Lab: NORMAL
Lab: NORMAL
SPECIMEN DESCRIPTION: NORMAL
SPECIMEN DESCRIPTION: NORMAL

## 2024-01-16 PROBLEM — D63.1 ANEMIA OF CHRONIC RENAL FAILURE: Status: ACTIVE | Noted: 2021-07-29

## 2024-01-25 ENCOUNTER — TELEPHONE (OUTPATIENT)
Dept: INFUSION THERAPY | Facility: MEDICAL CENTER | Age: 74
End: 2024-01-25

## 2024-01-25 NOTE — TELEPHONE ENCOUNTER
New order 1/16/24  Dr. Supa Holm  Dx; Anemia in chronic kidney disease  Retacrit 1000 units SQ monthly.  LABS:BMP, Hgb and Hct.  Order noted and chart to front office for processing.

## 2024-02-08 ENCOUNTER — HOSPITAL ENCOUNTER (OUTPATIENT)
Facility: MEDICAL CENTER | Age: 74
End: 2024-02-08
Payer: MEDICARE

## 2024-02-14 NOTE — DISCHARGE INSTRUCTIONS
signature______________________________________Date:________   Electronically signed by Mariah Palacios RN on 2/19/2024 at 2:18 PM   Electronically signed by COLBY PANTOJA CNP on 2/19/2024 at 1:41 PM

## 2024-02-16 ENCOUNTER — HOSPITAL ENCOUNTER (OUTPATIENT)
Age: 74
Setting detail: SPECIMEN
Discharge: HOME | End: 2024-02-16
Payer: MEDICARE

## 2024-02-16 ENCOUNTER — HOSPITAL ENCOUNTER (OUTPATIENT)
Dept: INFUSION THERAPY | Facility: MEDICAL CENTER | Age: 74
Discharge: HOME | End: 2024-02-16
Payer: MEDICARE

## 2024-02-16 VITALS
SYSTOLIC BLOOD PRESSURE: 105 MMHG | TEMPERATURE: 98.3 F | DIASTOLIC BLOOD PRESSURE: 62 MMHG | HEART RATE: 54 BPM | RESPIRATION RATE: 16 BRPM

## 2024-02-16 DIAGNOSIS — D63.1 ANEMIA OF CHRONIC RENAL FAILURE, UNSPECIFIED CKD STAGE: Primary | ICD-10-CM

## 2024-02-16 DIAGNOSIS — N18.9 ANEMIA OF CHRONIC RENAL FAILURE, UNSPECIFIED CKD STAGE: Primary | ICD-10-CM

## 2024-02-16 LAB
ANION GAP SERPL CALCULATED.3IONS-SCNC: 11 MMOL/L (ref 9–17)
BUN SERPL-MCNC: 36 MG/DL (ref 8–23)
BUN/CREAT SERPL: 26 (ref 9–20)
CALCIUM SERPL-MCNC: 9.9 MG/DL (ref 8.6–10.4)
CHLORIDE SERPL-SCNC: 105 MMOL/L (ref 98–107)
CO2 SERPL-SCNC: 27 MMOL/L (ref 20–31)
CREAT SERPL-MCNC: 1.4 MG/DL (ref 0.5–0.9)
GFR SERPL CREATININE-BSD FRML MDRD: 40 ML/MIN/1.73M2
GLUCOSE SERPL-MCNC: 71 MG/DL (ref 70–99)
HCT VFR BLD AUTO: 30.7 % (ref 36.3–47.1)
HGB BLD-MCNC: 9.8 G/DL (ref 11.9–15.1)
POTASSIUM SERPL-SCNC: 4.2 MMOL/L (ref 3.7–5.3)
SODIUM SERPL-SCNC: 143 MMOL/L (ref 135–144)

## 2024-02-16 PROCEDURE — 96372 THER/PROPH/DIAG INJ SC/IM: CPT

## 2024-02-16 PROCEDURE — 85014 HEMATOCRIT: CPT

## 2024-02-16 PROCEDURE — 80048 BASIC METABOLIC PNL TOTAL CA: CPT

## 2024-02-16 PROCEDURE — 85018 HEMOGLOBIN: CPT

## 2024-02-16 PROCEDURE — 36415 COLL VENOUS BLD VENIPUNCTURE: CPT

## 2024-02-16 PROCEDURE — 6360000002 HC RX W HCPCS: Performed by: INTERNAL MEDICINE

## 2024-02-16 RX ADMIN — EPOETIN ALFA-EPBX 10000 UNITS: 10000 INJECTION, SOLUTION INTRAVENOUS; SUBCUTANEOUS at 12:11

## 2024-02-16 NOTE — PROGRESS NOTES
Pt here for Retacrit (monthly)  Arrives ambulatory with walker  Denies complaints/concerns.  Labs reviewed, hgb 9.8.  Tx complete without incident  Pt given print out of [facility] leaflet on Retacrit and paper calendar with upcoming appointments.   Pt discharged in stable condition.  Returns 3/15 for Retacrit.

## 2024-02-19 ENCOUNTER — HOSPITAL ENCOUNTER (OUTPATIENT)
Dept: WOUND CARE | Age: 74
Discharge: HOME | End: 2024-02-19
Payer: MEDICARE

## 2024-02-19 VITALS
RESPIRATION RATE: 16 BRPM | BODY MASS INDEX: 22.58 KG/M2 | HEIGHT: 60 IN | SYSTOLIC BLOOD PRESSURE: 92 MMHG | HEART RATE: 58 BPM | DIASTOLIC BLOOD PRESSURE: 74 MMHG | TEMPERATURE: 98 F | WEIGHT: 115 LBS

## 2024-02-19 DIAGNOSIS — L89.223 PRESSURE INJURY OF LEFT THIGH, STAGE 3 (HCC): Primary | ICD-10-CM

## 2024-02-19 PROBLEM — Z87.898 H/O SYNCOPE: Status: ACTIVE | Noted: 2021-03-24

## 2024-02-19 PROBLEM — M79.89 LEFT LEG SWELLING: Status: ACTIVE | Noted: 2021-05-28

## 2024-02-19 PROBLEM — D50.9 IRON DEFICIENCY ANEMIA: Status: ACTIVE | Noted: 2024-02-19

## 2024-02-19 PROCEDURE — 11042 DBRDMT SUBQ TIS 1ST 20SQCM/<: CPT

## 2024-02-19 PROCEDURE — 99213 OFFICE O/P EST LOW 20 MIN: CPT

## 2024-02-19 RX ORDER — LIDOCAINE HYDROCHLORIDE 40 MG/ML
SOLUTION TOPICAL ONCE
OUTPATIENT
Start: 2024-02-19 | End: 2024-02-19

## 2024-02-19 RX ORDER — LIDOCAINE HYDROCHLORIDE 20 MG/ML
JELLY TOPICAL ONCE
OUTPATIENT
Start: 2024-02-19 | End: 2024-02-19

## 2024-02-19 ASSESSMENT — ENCOUNTER SYMPTOMS
COUGH: 0
DIARRHEA: 0
RHINORRHEA: 0
NAUSEA: 0
SHORTNESS OF BREATH: 0
VOMITING: 0

## 2024-02-19 ASSESSMENT — PAIN SCALES - GENERAL: PAINLEVEL_OUTOF10: 5

## 2024-02-19 NOTE — PROGRESS NOTES
[facility]   Progress Note and Procedure Note      Melodie Calero  MEDICAL RECORD NUMBER:  [id number]  AGE: 73 y.o.   GENDER: female  : 1950  EPISODE DATE:  2024    Subjective:     Chief Complaint   Patient presents with    Wound Check     Left Thigh         HISTORY of PRESENT ILLNESS HPI     Melodie Calero is a 73 y.o. female who presents today for wound/ulcer evaluation.   History of Wound Context: presents for initial evaluation of left posterior thigh pressure ulceration that has been present for last month per patient. She has home care but does not know what company.   Wound/Ulcer Pain Timing/Severity: intermittent  Quality of pain: sharp  Severity:  4 / 10   Modifying Factors: Pain worsens with touching  Associated Signs/Symptoms: none    Ulcer Identification:  Ulcer Type: pressure  Contributing Factors: chronic pressure, decreased mobility, and shear force         PAST MEDICAL HISTORY        Diagnosis Date    Hypertension     Small cell B-cell lymphoma (HCC)        PAST SURGICAL HISTORY    Past Surgical History:   Procedure Laterality Date    BONE MARROW BIOPSY Right 10/12/2016    By Dr. Velázquez       FAMILY HISTORY    Family History   Problem Relation Age of Onset    Heart Failure Father     Diabetes Brother        SOCIAL HISTORY    Social History     Tobacco Use    Smoking status: Never   Substance Use Topics    Alcohol use: No    Drug use: No       ALLERGIES    No Known Allergies    MEDICATIONS    Current Outpatient Medications on File Prior to Encounter   Medication Sig Dispense Refill    carvedilol (COREG) 25 MG tablet Take 1 tablet by mouth 2 times daily (with meals)      calcium citrate (CALCITRATE) 200 MG TABS tablet Take 2 tablets by mouth 3 times daily      vitamin D (CHOLECALCIFEROL) 25 MCG (1000 UT) TABS tablet Take 1 tablet by mouth 2 times daily (with meals)      gabapentin (NEURONTIN) 100 MG capsule Take 1-2 capsules by mouth 3 times daily.

## 2024-02-21 NOTE — DISCHARGE INSTRUCTIONS
[facility] -Phone: [phone] Fax: [phone]    Visit  Discharge Instructions / Physician Orders     DATE: 2/26/2024     Home Care: [facility]      SUPPLIES ORDERED THRU: Home Health     Wound Location: Left Thigh     Cleanse with: Liquid antibacterial soap and water, rinse well      Dressing Orders: Collagen with silver and silicone bandage     Frequency: Monday, Wednesday and Friday (will be done in wound care on appointment days)     Additional Orders: Increase protein to diet (meat, cheese, eggs, fish, peanut butter, nuts and beans)  DO NOT lay on Left Side     Your next appointment with Wound Care Center is in 1 week     (Please note your next appointment above and if you are unable to keep, kindly give a 24 hour notice. Thank you.)  If more than 15 min late we cannot guarantee you will be seen due to clinician schedule  Per Policy, Excessive cancellation will call for dismissal from program.     If you experience any of the following, please call the Wound Care Center during business hours:  [phone]  Your Phone call may be forwarded to [facility] during business hours that [facility] is closed.     * Increase in Pain  * Temperature over 101  * Increase in drainage from your wound  * Drainage with a foul odor  * Bleeding  * Increase in swelling  * Need for compression bandage changes due to slippage, breakthrough drainage.     If you need medical attention outside of the business hours of the Wound Care Centers please contact your PCP or go to the nearest emergency room.     The information contained in the After Visit Summary has been reviewed with me, the patient and/or responsible adult, by my health care provider(s). I had the opportunity to ask questions regarding this information. I have elected to receive;      []After Visit Summary  [x]Comprehensive Discharge Instruction        Patient signature______________________________________Date:________

## 2024-02-26 ENCOUNTER — HOSPITAL ENCOUNTER (OUTPATIENT)
Dept: WOUND CARE | Age: 74
Discharge: HOME | End: 2024-02-26
Payer: MEDICARE

## 2024-02-26 VITALS
RESPIRATION RATE: 18 BRPM | DIASTOLIC BLOOD PRESSURE: 62 MMHG | SYSTOLIC BLOOD PRESSURE: 129 MMHG | HEART RATE: 49 BPM | WEIGHT: 114.64 LBS | TEMPERATURE: 97.2 F | BODY MASS INDEX: 22.39 KG/M2

## 2024-02-26 DIAGNOSIS — L89.223 PRESSURE INJURY OF LEFT THIGH, STAGE 3 (HCC): Primary | ICD-10-CM

## 2024-02-26 PROCEDURE — 11042 DBRDMT SUBQ TIS 1ST 20SQCM/<: CPT | Performed by: NURSE PRACTITIONER

## 2024-02-26 PROCEDURE — 11042 DBRDMT SUBQ TIS 1ST 20SQCM/<: CPT

## 2024-02-26 RX ORDER — LIDOCAINE HYDROCHLORIDE 20 MG/ML
JELLY TOPICAL ONCE
OUTPATIENT
Start: 2024-02-26 | End: 2024-02-26

## 2024-02-26 RX ORDER — LIDOCAINE HYDROCHLORIDE 20 MG/ML
JELLY TOPICAL ONCE
Status: COMPLETED | OUTPATIENT
Start: 2024-02-26 | End: 2024-02-26

## 2024-02-26 RX ORDER — LIDOCAINE HYDROCHLORIDE 40 MG/ML
SOLUTION TOPICAL ONCE
OUTPATIENT
Start: 2024-02-26 | End: 2024-02-26

## 2024-02-26 RX ADMIN — LIDOCAINE HYDROCHLORIDE 6 ML: 20 JELLY TOPICAL at 13:01

## 2024-02-26 ASSESSMENT — ENCOUNTER SYMPTOMS
SHORTNESS OF BREATH: 0
VOMITING: 0
NAUSEA: 0
DIARRHEA: 0
RHINORRHEA: 0
COUGH: 0

## 2024-02-26 ASSESSMENT — PAIN DESCRIPTION - DESCRIPTORS: DESCRIPTORS: TENDER

## 2024-02-26 ASSESSMENT — PAIN SCALES - GENERAL: PAINLEVEL_OUTOF10: 5

## 2024-02-26 ASSESSMENT — PAIN DESCRIPTION - LOCATION: LOCATION: HIP

## 2024-02-26 ASSESSMENT — PAIN DESCRIPTION - ORIENTATION: ORIENTATION: LEFT

## 2024-02-26 NOTE — PROGRESS NOTES
no clubbing or edema   Musculoskeletal: no joint swelling, deformity or tenderness  Neurologic: gait, coordination normal and speech normal      Assessment:     Problem List Items Addressed This Visit       Pressure injury of left thigh, stage 3 (HCC) - Primary    Relevant Orders    Initiate Outpatient Wound Care Protocol        Procedure Note  Indications:  Based on my examination of this patient's wound(s)/ulcer(s) today, debridement is required to promote healing and evaluate the wound base.    Performed by: COLBY PANTOJA - CNP    Consent obtained:  Yes    Time out taken:  Yes    Pain Control: Anesthetic  Anesthetic: 2% Lidocaine Gel Topical     Debridement:Excisional Debridement    Using curette the wound(s)/ulcer(s) was/were sharply debrided down through and including the removal of subcutaneous tissue.        Devitalized Tissue Debrided:  fibrin, biofilm, and slough    Pre Debridement Measurements:  Are located in the Wound/Ulcer Documentation Flow Sheet    Wound/Ulcer #: 1    Post Debridement Measurements:  Wound/Ulcer Descriptions are Pre Debridement except measurements:    Wound 02/19/24 Thigh Left;Lateral #1 (Active)   Wound Image   02/19/24 1354   Wound Etiology Pressure Stage 3 02/26/24 1302   Dressing Status Old drainage noted;New drainage noted 02/26/24 1302   Wound Cleansed Cleansed with saline 02/26/24 1302   Wound Length (cm) 4.7 cm 02/26/24 1302   Wound Width (cm) 3 cm 02/26/24 1302   Wound Depth (cm) 0.3 cm 02/26/24 1302   Wound Surface Area (cm^2) 14.1 cm^2 02/26/24 1302   Change in Wound Size % (l*w) 32.86 02/26/24 1302   Wound Volume (cm^3) 4.23 cm^3 02/26/24 1302   Wound Healing % 33 02/26/24 1302   Post-Procedure Length (cm) 4.7 cm 02/26/24 1302   Post-Procedure Width (cm) 3 cm 02/26/24 1302   Post-Procedure Depth (cm) 0.3 cm 02/26/24 1302   Post-Procedure Surface Area (cm^2) 14.1 cm^2 02/26/24 1302   Post-Procedure Volume (cm^3) 4.23 cm^3 02/26/24 1302   Wound Assessment

## 2024-02-28 NOTE — DISCHARGE INSTRUCTIONS
Electronically signed by Mariah Palacios RN on 3/4/2024 at 3:31 PM   Electronically signed by COLBY PANTOJA - CNP on 3/4/2024 at 3:28 PM

## 2024-03-04 ENCOUNTER — HOSPITAL ENCOUNTER (OUTPATIENT)
Dept: WOUND CARE | Age: 74
Discharge: HOME | End: 2024-03-04
Payer: MEDICARE

## 2024-03-04 VITALS
SYSTOLIC BLOOD PRESSURE: 119 MMHG | HEART RATE: 54 BPM | TEMPERATURE: 98.7 F | DIASTOLIC BLOOD PRESSURE: 31 MMHG | RESPIRATION RATE: 16 BRPM

## 2024-03-04 DIAGNOSIS — L89.223 PRESSURE INJURY OF LEFT THIGH, STAGE 3 (HCC): Primary | ICD-10-CM

## 2024-03-04 PROCEDURE — 11042 DBRDMT SUBQ TIS 1ST 20SQCM/<: CPT | Performed by: NURSE PRACTITIONER

## 2024-03-04 PROCEDURE — 11042 DBRDMT SUBQ TIS 1ST 20SQCM/<: CPT

## 2024-03-04 RX ORDER — LIDOCAINE HYDROCHLORIDE 40 MG/ML
SOLUTION TOPICAL ONCE
OUTPATIENT
Start: 2024-03-04 | End: 2024-03-04

## 2024-03-04 RX ORDER — LIDOCAINE HYDROCHLORIDE 20 MG/ML
JELLY TOPICAL ONCE
OUTPATIENT
Start: 2024-03-04 | End: 2024-03-04

## 2024-03-04 RX ORDER — LIDOCAINE HYDROCHLORIDE 20 MG/ML
JELLY TOPICAL ONCE
Status: COMPLETED | OUTPATIENT
Start: 2024-03-04 | End: 2024-03-04

## 2024-03-04 RX ADMIN — LIDOCAINE HYDROCHLORIDE 6 ML: 20 JELLY TOPICAL at 15:06

## 2024-03-04 ASSESSMENT — ENCOUNTER SYMPTOMS
COUGH: 0
RHINORRHEA: 0
SHORTNESS OF BREATH: 0
VOMITING: 0
DIARRHEA: 0
NAUSEA: 0

## 2024-03-04 NOTE — PROGRESS NOTES
[facility]   Progress Note and Procedure Note      Melodie Calero  MEDICAL RECORD NUMBER:  [id number]  AGE: 73 y.o.   GENDER: female  : 1950  EPISODE DATE:  3/4/2024    Subjective:     Chief Complaint   Patient presents with    Wound Check     Left thigh         HISTORY of PRESENT ILLNESS HPI     Melodie Calero is a 73 y.o. female who presents today for wound/ulcer evaluation.   History of Wound Context: presents in follow up on left posterior thigh pressure ulceration that continues to improve. Home care with [facility].   Wound/Ulcer Pain Timing/Severity: intermittent  Quality of pain: sharp  Severity:  2  10   Modifying Factors: None  Associated Signs/Symptoms: none    Ulcer Identification:  Ulcer Type: pressure  Contributing Factors: chronic pressure, decreased mobility, and shear force         PAST MEDICAL HISTORY        Diagnosis Date    Hypertension     Small cell B-cell lymphoma (HCC)        PAST SURGICAL HISTORY    Past Surgical History:   Procedure Laterality Date    BONE MARROW BIOPSY Right 10/12/2016    By Dr. Velázquez       FAMILY HISTORY    Family History   Problem Relation Age of Onset    Heart Failure Father     Diabetes Brother        SOCIAL HISTORY    Social History     Tobacco Use    Smoking status: Never   Substance Use Topics    Alcohol use: No    Drug use: No       ALLERGIES    No Known Allergies    MEDICATIONS    Current Outpatient Medications on File Prior to Encounter   Medication Sig Dispense Refill    carvedilol (COREG) 25 MG tablet Take 1 tablet by mouth 2 times daily (with meals) (Patient not taking: Reported on 2024)      calcium citrate (CALCITRATE) 200 MG TABS tablet Take 2 tablets by mouth 3 times daily      vitamin D (CHOLECALCIFEROL) 25 MCG (1000 UT) TABS tablet Take 1 tablet by mouth 2 times daily (with meals)      gabapentin (NEURONTIN) 100 MG capsule Take 1-2 capsules by mouth 3 times daily.      ibrutinib (IMBRUVICA) 420 MG tablet Take

## 2024-03-13 NOTE — DISCHARGE INSTRUCTIONS
[facility] -Phone: [phone] Fax: [phone]    Visit  Discharge Instructions / Physician Orders     DATE: 3/18/2024     [facility] Care: [facility]      SUPPLIES ORDERED THRU: Home Health     Wound Location: Left Thigh     Cleanse with: Liquid antibacterial soap and water, rinse well      Dressing Orders: Collagen with silver and silicone bandage     Frequency: Monday, Wednesday and Friday (will be done in wound care on appointment days)     Additional Orders: Increase protein to diet (meat, cheese, eggs, fish, peanut butter, nuts and beans)  DO NOT lay on Left Side     Your next appointment with Wound Care Center is in 2 weeks     (Please note your next appointment above and if you are unable to keep, kindly give a 24 hour notice. Thank you.)  If more than 15 min late we cannot guarantee you will be seen due to clinician schedule  Per Policy, Excessive cancellation will call for dismissal from program.     If you experience any of the following, please call the Wound Care Center during business hours:  [phone]  Your Phone call may be forwarded to [facility] during business hours that [facility] is closed.     * Increase in Pain  * Temperature over 101  * Increase in drainage from your wound  * Drainage with a foul odor  * Bleeding  * Increase in swelling  * Need for compression bandage changes due to slippage, breakthrough drainage.     If you need medical attention outside of the business hours of the Wound Care Centers please contact your PCP or go to the nearest emergency room.     The information contained in the After Visit Summary has been reviewed with me, the patient and/or responsible adult, by my health care provider(s). I had the opportunity to ask questions regarding this information. I have elected to receive;      []After Visit Summary  [x]Comprehensive Discharge Instruction        Patient signature______________________________________Date:________

## 2024-03-15 ENCOUNTER — HOSPITAL ENCOUNTER (OUTPATIENT)
Dept: INFUSION THERAPY | Facility: MEDICAL CENTER | Age: 74
End: 2024-03-15
Payer: MEDICARE

## 2024-03-15 ENCOUNTER — HOSPITAL ENCOUNTER (OUTPATIENT)
Facility: MEDICAL CENTER | Age: 74
Discharge: HOME | End: 2024-03-15
Payer: MEDICARE

## 2024-03-15 LAB
ANION GAP SERPL CALCULATED.3IONS-SCNC: 9 MMOL/L (ref 9–17)
BUN SERPL-MCNC: 29 MG/DL (ref 8–23)
BUN/CREAT SERPL: 24 (ref 9–20)
CALCIUM SERPL-MCNC: 9.8 MG/DL (ref 8.6–10.4)
CHLORIDE SERPL-SCNC: 105 MMOL/L (ref 98–107)
CO2 SERPL-SCNC: 26 MMOL/L (ref 20–31)
CREAT SERPL-MCNC: 1.2 MG/DL (ref 0.5–0.9)
GFR SERPL CREATININE-BSD FRML MDRD: 48 ML/MIN/1.73M2
GLUCOSE SERPL-MCNC: 101 MG/DL (ref 70–99)
HCT VFR BLD AUTO: 31.3 % (ref 36.3–47.1)
HGB BLD-MCNC: 10.1 G/DL (ref 11.9–15.1)
POTASSIUM SERPL-SCNC: 4.3 MMOL/L (ref 3.7–5.3)
SODIUM SERPL-SCNC: 140 MMOL/L (ref 135–144)

## 2024-03-15 PROCEDURE — 85014 HEMATOCRIT: CPT

## 2024-03-15 PROCEDURE — 36415 COLL VENOUS BLD VENIPUNCTURE: CPT

## 2024-03-15 PROCEDURE — 80048 BASIC METABOLIC PNL TOTAL CA: CPT

## 2024-03-15 PROCEDURE — 85018 HEMOGLOBIN: CPT

## 2024-03-15 NOTE — PROGRESS NOTES
Patient arrives to waiting area for possible retacrit injection.  Orders & labs reviewed.  Hemoglobin 10.1. No need for injection.  Patient provided with a calendar upon discharge.

## 2024-03-18 ENCOUNTER — HOSPITAL ENCOUNTER (OUTPATIENT)
Dept: WOUND CARE | Age: 74
Discharge: HOME | End: 2024-03-18

## 2024-03-21 NOTE — DISCHARGE INSTRUCTIONS
[facility] -Phone: [phone] Fax: [phone]    Visit  Discharge Instructions / Physician Orders     DATE: 3/25/2024     [facility] Care: [facility]      SUPPLIES ORDERED THRU: Home Health     Wound Location: Left Thigh     Cleanse with: Liquid antibacterial soap and water, rinse well      Dressing Orders: Collagen with silver and silicone bandage     Frequency: Monday, Wednesday and Friday (will be done in wound care on appointment days)     Additional Orders: Increase protein to diet (meat, cheese, eggs, fish, peanut butter, nuts and beans)  DO NOT lay on Left Side     Your next appointment with Wound Care Center is in 2 weeks     (Please note your next appointment above and if you are unable to keep, kindly give a 24 hour notice. Thank you.)  If more than 15 min late we cannot guarantee you will be seen due to clinician schedule  Per Policy, Excessive cancellation will call for dismissal from program.     If you experience any of the following, please call the Wound Care Center during business hours:  [phone]  Your Phone call may be forwarded to [facility] during business hours that [facility] is closed.     * Increase in Pain  * Temperature over 101  * Increase in drainage from your wound  * Drainage with a foul odor  * Bleeding  * Increase in swelling  * Need for compression bandage changes due to slippage, breakthrough drainage.     If you need medical attention outside of the business hours of the Wound Care Centers please contact your PCP or go to the nearest emergency room.     The information contained in the After Visit Summary has been reviewed with me, the patient and/or responsible adult, by my health care provider(s). I had the opportunity to ask questions regarding this information. I have elected to receive;      []After Visit Summary  [x]Comprehensive Discharge Instruction        Patient signature______________________________________Date:________

## 2024-03-25 ENCOUNTER — HOSPITAL ENCOUNTER (OUTPATIENT)
Dept: WOUND CARE | Age: 74
Discharge: HOME | End: 2024-03-25

## 2024-06-18 ENCOUNTER — APPOINTMENT (OUTPATIENT)
Dept: CT IMAGING | Age: 74
End: 2024-06-18
Payer: MEDICARE

## 2024-06-18 ENCOUNTER — HOSPITAL ENCOUNTER (EMERGENCY)
Age: 74
Discharge: HOME | End: 2024-06-18
Attending: EMERGENCY MEDICINE
Payer: MEDICARE

## 2024-06-18 VITALS
BODY MASS INDEX: 23.16 KG/M2 | RESPIRATION RATE: 18 BRPM | TEMPERATURE: 96.8 F | WEIGHT: 118 LBS | HEIGHT: 60 IN | SYSTOLIC BLOOD PRESSURE: 109 MMHG | OXYGEN SATURATION: 8 % | HEART RATE: 60 BPM | DIASTOLIC BLOOD PRESSURE: 47 MMHG

## 2024-06-18 DIAGNOSIS — N30.00 ACUTE CYSTITIS WITHOUT HEMATURIA: Primary | ICD-10-CM

## 2024-06-18 LAB
ABO + RH BLD: NORMAL
ALBUMIN SERPL-MCNC: 2.8 G/DL (ref 3.5–5.2)
ALBUMIN/GLOB SERPL: 0.9 {RATIO} (ref 1–2.5)
ALP SERPL-CCNC: 97 U/L (ref 35–104)
ALT SERPL-CCNC: 22 U/L (ref 5–33)
ANION GAP SERPL CALCULATED.3IONS-SCNC: 9 MMOL/L (ref 9–17)
ANTIBODY IDENTIFICATION: NORMAL
ARM BAND NUMBER: NORMAL
AST SERPL-CCNC: 33 U/L
BACTERIA URNS QL MICRO: ABNORMAL
BASOPHILS # BLD: 0 K/UL (ref 0–0.2)
BASOPHILS NFR BLD: 0 % (ref 0–2)
BILIRUB SERPL-MCNC: 0.3 MG/DL (ref 0.3–1.2)
BILIRUB UR QL STRIP: ABNORMAL
BLOOD BANK SAMPLE EXPIRATION: NORMAL
BLOOD GROUP ANTIBODIES SERPL: POSITIVE
BUN SERPL-MCNC: 36 MG/DL (ref 8–23)
CALCIUM SERPL-MCNC: 8.6 MG/DL (ref 8.6–10.4)
CHARACTER UR: ABNORMAL
CHLORIDE SERPL-SCNC: 96 MMOL/L (ref 98–107)
CLARITY UR: ABNORMAL
CO2 SERPL-SCNC: 27 MMOL/L (ref 20–31)
COLOR UR: YELLOW
CREAT SERPL-MCNC: 2.1 MG/DL (ref 0.5–0.9)
EOSINOPHIL # BLD: 0 K/UL (ref 0–0.4)
EOSINOPHILS RELATIVE PERCENT: 0 % (ref 1–4)
EPI CELLS #/AREA URNS HPF: ABNORMAL /HPF (ref 0–5)
ERYTHROCYTE [DISTWIDTH] IN BLOOD BY AUTOMATED COUNT: 17.1 % (ref 12.5–15.4)
GFR, ESTIMATED: 25 ML/MIN/1.73M2
GLUCOSE SERPL-MCNC: 135 MG/DL (ref 70–99)
GLUCOSE UR STRIP-MCNC: ABNORMAL MG/DL
HCT VFR BLD AUTO: 21.3 % (ref 36–46)
HGB BLD-MCNC: 7.3 G/DL (ref 12–16)
HGB UR QL STRIP.AUTO: ABNORMAL
KETONES UR STRIP-MCNC: ABNORMAL MG/DL
LACTATE BLDV-SCNC: 1.5 MMOL/L (ref 0.5–2.2)
LEUKOCYTE ESTERASE UR QL STRIP: ABNORMAL
LIPASE SERPL-CCNC: 24 U/L (ref 13–60)
LYMPHOCYTES NFR BLD: 0.18 K/UL (ref 1–4.8)
LYMPHOCYTES RELATIVE PERCENT: 4 % (ref 24–44)
MCH RBC QN AUTO: 30.9 PG (ref 26–34)
MCHC RBC AUTO-ENTMCNC: 34.3 G/DL (ref 31–37)
MCV RBC AUTO: 90.1 FL (ref 80–100)
MONOCYTES NFR BLD: 0.31 K/UL (ref 0.1–0.8)
MONOCYTES NFR BLD: 7 % (ref 1–7)
MORPHOLOGY: ABNORMAL
NEUTROPHILS NFR BLD: 89 % (ref 36–66)
NEUTS SEG NFR BLD: 3.91 K/UL (ref 1.8–7.7)
NITRITE UR QL STRIP: NEGATIVE
PH UR STRIP: 7 [PH] (ref 5–8)
PLATELET # BLD AUTO: 146 K/UL (ref 140–450)
PMV BLD AUTO: 6.7 FL (ref 6–12)
POTASSIUM SERPL-SCNC: 3.7 MMOL/L (ref 3.7–5.3)
PROT SERPL-MCNC: 5.8 G/DL (ref 6.4–8.3)
PROT UR STRIP-MCNC: ABNORMAL MG/DL
RBC # BLD AUTO: 2.37 M/UL (ref 4–5.2)
RBC #/AREA URNS HPF: ABNORMAL /HPF (ref 0–2)
SODIUM SERPL-SCNC: 132 MMOL/L (ref 135–144)
SP GR UR STRIP: 1.02 (ref 1–1.03)
UROBILINOGEN UR STRIP-ACNC: NORMAL EU/DL (ref 0–1)
WBC #/AREA URNS HPF: ABNORMAL /HPF (ref 0–5)
WBC OTHER # BLD: 4.4 K/UL (ref 3.5–11)

## 2024-06-18 PROCEDURE — 87086 URINE CULTURE/COLONY COUNT: CPT

## 2024-06-18 PROCEDURE — 86850 RBC ANTIBODY SCREEN: CPT

## 2024-06-18 PROCEDURE — 87186 SC STD MICRODIL/AGAR DIL: CPT

## 2024-06-18 PROCEDURE — 87077 CULTURE AEROBIC IDENTIFY: CPT

## 2024-06-18 PROCEDURE — 83605 ASSAY OF LACTIC ACID: CPT

## 2024-06-18 PROCEDURE — 86870 RBC ANTIBODY IDENTIFICATION: CPT

## 2024-06-18 PROCEDURE — 85025 COMPLETE CBC W/AUTO DIFF WBC: CPT

## 2024-06-18 PROCEDURE — 86900 BLOOD TYPING SEROLOGIC ABO: CPT

## 2024-06-18 PROCEDURE — 81001 URINALYSIS AUTO W/SCOPE: CPT

## 2024-06-18 PROCEDURE — 99284 EMERGENCY DEPT VISIT MOD MDM: CPT | Performed by: EMERGENCY MEDICINE

## 2024-06-18 PROCEDURE — 86901 BLOOD TYPING SEROLOGIC RH(D): CPT

## 2024-06-18 PROCEDURE — 83690 ASSAY OF LIPASE: CPT

## 2024-06-18 PROCEDURE — 74176 CT ABD & PELVIS W/O CONTRAST: CPT

## 2024-06-18 PROCEDURE — 80053 COMPREHEN METABOLIC PANEL: CPT

## 2024-06-18 RX ORDER — CEPHALEXIN 250 MG/1
250 CAPSULE ORAL 2 TIMES DAILY
Qty: 20 CAPSULE | Refills: 0 | Status: SHIPPED | OUTPATIENT
Start: 2024-06-18 | End: 2024-06-28

## 2024-06-18 RX ORDER — SODIUM CHLORIDE 0.9 % (FLUSH) 0.9 %
3 SYRINGE (ML) INJECTION EVERY 8 HOURS
Status: DISCONTINUED | OUTPATIENT
Start: 2024-06-18 | End: 2024-06-18 | Stop reason: HOSPADM

## 2024-06-18 ASSESSMENT — LIFESTYLE VARIABLES
HOW MANY STANDARD DRINKS CONTAINING ALCOHOL DO YOU HAVE ON A TYPICAL DAY: 1 OR 2
HOW OFTEN DO YOU HAVE A DRINK CONTAINING ALCOHOL: MONTHLY OR LESS

## 2024-06-18 NOTE — ED NOTES
Updated nurse at [facility] on patients condition and that she will be coming back to facility.

## 2024-06-18 NOTE — ED PROVIDER NOTES
(IMBRUVICA) 420 MG TABLET    Take 1 tablet by mouth daily    NITROGLYCERIN (NITROSTAT) 0.4 MG SL TABLET    Place 1 tablet under the tongue every 5 minutes as needed for Chest pain    RAMIPRIL (ALTACE) 5 MG CAPSULE    Take 1 capsule by mouth daily    VITAMIN D (CHOLECALCIFEROL) 25 MCG (1000 UT) TABS TABLET    Take 1 tablet by mouth 2 times daily (with meals)       ALLERGIES     has No Known Allergies.    FAMILY HISTORY     She indicated that the status of her father is unknown. She indicated that the status of her brother is unknown.     family history includes Diabetes in her brother; Heart Failure in her father.    SOCIAL HISTORY      reports that she has never smoked. She does not have any smokeless tobacco history on file. She reports that she does not drink alcohol and does not use drugs.    PHYSICAL EXAM     INITIAL VITALS:  height is 1.524 m (5') and weight is 53.5 kg (118 lb). Her oral temperature is 96.8 °F (36 °C). Her blood pressure is 104/46 (abnormal) and her pulse is 67. Her respiration is 18 and oxygen saturation is 100%.   Physical Exam  Vitals reviewed.   Constitutional:       General: She is not in acute distress.     Appearance: She is well-developed.   HENT:      Head: Normocephalic and atraumatic.   Eyes:      Conjunctiva/sclera: Conjunctivae normal.      Pupils: Pupils are equal, round, and reactive to light.   Neck:      Trachea: No tracheal deviation.   Cardiovascular:      Rate and Rhythm: Normal rate and regular rhythm.   Pulmonary:      Effort: No respiratory distress.      Breath sounds: Normal breath sounds.   Abdominal:      General: Bowel sounds are normal. There is no distension.      Palpations: Abdomen is soft.      Tenderness: There is abdominal tenderness. There is no guarding or rebound.      Comments: Diminished diffusely tender without any pain localizing to any specific area.  No surgical findings   Musculoskeletal:         General: No tenderness. Normal range of motion.

## 2024-06-19 NOTE — ED NOTES
Pt resting in bed comfortably with no complaints. Pt repositioned for comfort and sat up. Pt offered food/beverage. Pt asked for water. Pt states she wasn't hungry but brought napoleon crackers for her to eat. Pt has no needs or complaints. Pt updated on plan of care and that we are waiting for transport. IV removed at this time. Pt's belongings placed in bag, ready for transport. Will continue to monitor.

## 2024-06-21 LAB
MICROORGANISM SPEC CULT: ABNORMAL
MICROORGANISM SPEC CULT: ABNORMAL
SPECIMEN DESCRIPTION: ABNORMAL